# Patient Record
Sex: FEMALE | Race: WHITE | NOT HISPANIC OR LATINO | Employment: PART TIME | URBAN - METROPOLITAN AREA
[De-identification: names, ages, dates, MRNs, and addresses within clinical notes are randomized per-mention and may not be internally consistent; named-entity substitution may affect disease eponyms.]

---

## 2017-01-15 ENCOUNTER — APPOINTMENT (EMERGENCY)
Dept: RADIOLOGY | Facility: HOSPITAL | Age: 27
End: 2017-01-15
Payer: COMMERCIAL

## 2017-01-15 ENCOUNTER — HOSPITAL ENCOUNTER (EMERGENCY)
Facility: HOSPITAL | Age: 27
Discharge: HOME | End: 2017-01-15
Admitting: EMERGENCY MEDICINE
Payer: COMMERCIAL

## 2017-01-15 VITALS
DIASTOLIC BLOOD PRESSURE: 64 MMHG | RESPIRATION RATE: 16 BRPM | HEART RATE: 92 BPM | TEMPERATURE: 97.8 F | BODY MASS INDEX: 28.32 KG/M2 | WEIGHT: 170 LBS | SYSTOLIC BLOOD PRESSURE: 122 MMHG | OXYGEN SATURATION: 99 % | HEIGHT: 65 IN

## 2017-01-15 DIAGNOSIS — S39.012A LUMBAR STRAIN: ICD-10-CM
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
## 2017-01-15 DIAGNOSIS — S29.019A STRAIN OF THORACIC REGION: ICD-10-CM

## 2017-01-15 PROCEDURE — 99284 EMERGENCY DEPT VISIT MOD MDM: CPT

## 2017-01-15 PROCEDURE — 72072 X-RAY EXAM THORAC SPINE 3VWS: CPT

## 2017-01-15 PROCEDURE — 72100 X-RAY EXAM L-S SPINE 2/3 VWS: CPT

## 2017-01-15 RX ORDER — IBUPROFEN 200 MG
400 TABLET ORAL ONCE
Status: COMPLETED | OUTPATIENT
Start: 2017-01-15 | End: 2017-01-15

## 2017-01-15 RX ORDER — CYCLOBENZAPRINE HCL 10 MG
10 TABLET ORAL 3 TIMES DAILY PRN
Qty: 15 TABLET | Refills: 0 | Status: SHIPPED | OUTPATIENT
Start: 2017-01-15 | End: 2019-08-16

## 2017-01-15 RX ORDER — LORAZEPAM 1 MG/1
1 TABLET ORAL EVERY 6 HOURS PRN
COMMUNITY
End: 2018-02-14 | Stop reason: SDUPTHER

## 2017-01-15 RX ADMIN — IBUPROFEN 400 MG: 200 TABLET, FILM COATED ORAL at 12:22

## 2017-04-19 ENCOUNTER — GENERIC CONVERSION - ENCOUNTER (OUTPATIENT)
Dept: OTHER | Facility: OTHER | Age: 27
End: 2017-04-19

## 2017-04-28 ENCOUNTER — ALLSCRIPTS OFFICE VISIT (OUTPATIENT)
Dept: OTHER | Facility: OTHER | Age: 27
End: 2017-04-28

## 2017-04-28 ENCOUNTER — GENERIC CONVERSION - ENCOUNTER (OUTPATIENT)
Dept: OTHER | Facility: OTHER | Age: 27
End: 2017-04-28

## 2017-04-28 DIAGNOSIS — E01.0 IODINE-DEFICIENCY RELATED DIFFUSE GOITER: ICD-10-CM

## 2017-04-29 LAB
T3FREE SERPL-MCNC: 119 NG/DL (ref 71–180)
T4 FREE SERPL-MCNC: 0.87 NG/DL (ref 0.82–1.77)
THYROGLOBULIN AB (HISTORICAL): 66.7 IU/ML (ref 0–0.9)
TSH SERPL DL<=0.05 MIU/L-ACNC: 8.29 UIU/ML (ref 0.45–4.5)

## 2017-05-05 ENCOUNTER — GENERIC CONVERSION - ENCOUNTER (OUTPATIENT)
Dept: OTHER | Facility: OTHER | Age: 27
End: 2017-05-05

## 2017-05-05 LAB — THYROGLOBULIN BY RIA (HISTORICAL): 34 NG/ML

## 2017-05-06 ENCOUNTER — ALLSCRIPTS OFFICE VISIT (OUTPATIENT)
Dept: OTHER | Facility: OTHER | Age: 27
End: 2017-05-06

## 2017-06-01 ENCOUNTER — GENERIC CONVERSION - ENCOUNTER (OUTPATIENT)
Dept: OTHER | Facility: OTHER | Age: 27
End: 2017-06-01

## 2017-06-02 ENCOUNTER — ALLSCRIPTS OFFICE VISIT (OUTPATIENT)
Dept: OTHER | Facility: OTHER | Age: 27
End: 2017-06-02

## 2017-06-02 LAB
T3FREE SERPL-MCNC: 125 NG/DL (ref 71–180)
T4 FREE SERPL-MCNC: 0.99 NG/DL (ref 0.82–1.77)
TSH SERPL DL<=0.05 MIU/L-ACNC: 6.11 UIU/ML (ref 0.45–4.5)

## 2017-06-03 ENCOUNTER — HOSPITAL ENCOUNTER (OUTPATIENT)
Dept: ULTRASOUND IMAGING | Facility: HOSPITAL | Age: 27
Discharge: HOME | End: 2017-06-03
Payer: COMMERCIAL

## 2017-06-03 DIAGNOSIS — E01.0 IODINE-DEFICIENCY RELATED DIFFUSE GOITER: ICD-10-CM

## 2017-06-03 PROCEDURE — 76536 US EXAM OF HEAD AND NECK: CPT

## 2017-06-06 ENCOUNTER — GENERIC CONVERSION - ENCOUNTER (OUTPATIENT)
Dept: OTHER | Facility: OTHER | Age: 27
End: 2017-06-06

## 2017-06-12 ENCOUNTER — HOSPITAL ENCOUNTER (OUTPATIENT)
Dept: RADIOLOGY | Facility: HOSPITAL | Age: 27
Discharge: HOME | End: 2017-06-12
Payer: COMMERCIAL

## 2017-06-12 ENCOUNTER — ALLSCRIPTS OFFICE VISIT (OUTPATIENT)
Dept: OTHER | Facility: OTHER | Age: 27
End: 2017-06-12

## 2017-06-12 ENCOUNTER — TRANSCRIBE ORDERS (OUTPATIENT)
Dept: ADMINISTRATIVE | Facility: HOSPITAL | Age: 27
End: 2017-06-12

## 2017-06-12 DIAGNOSIS — R07.9 CHEST PAIN: ICD-10-CM

## 2017-06-12 PROCEDURE — 71020 HB CHEST X-RAY 2VW FRONTAL&LATL: CPT

## 2017-06-14 ENCOUNTER — GENERIC CONVERSION - ENCOUNTER (OUTPATIENT)
Dept: OTHER | Facility: OTHER | Age: 27
End: 2017-06-14

## 2017-06-15 ENCOUNTER — GENERIC CONVERSION - ENCOUNTER (OUTPATIENT)
Dept: OTHER | Facility: OTHER | Age: 27
End: 2017-06-15

## 2017-06-16 ENCOUNTER — GENERIC CONVERSION - ENCOUNTER (OUTPATIENT)
Dept: OTHER | Facility: OTHER | Age: 27
End: 2017-06-16

## 2017-06-23 ENCOUNTER — ALLSCRIPTS OFFICE VISIT (OUTPATIENT)
Dept: OTHER | Facility: OTHER | Age: 27
End: 2017-06-23

## 2018-01-11 NOTE — MISCELLANEOUS
Message   Recorded as Task   Date: 10/05/2016 05:57 PM, Created By: Laurier Najjar   Task Name: Med Renewal Request   Assigned To: Rupa Perez   Regarding Patient: Susy No, Status: Active   Comment:    Ana Duong - 05 Oct 2016 5:57 PM     TASK CREATED  Caller: Self; Renew Medication; [phone] (Home)  requesting alprazalam refill - asking for dose to be upped as she is taking 3 a day when having anxiety    please advise    lukasz Perez - 06 Oct 2016 7:32 AM     TASK REPLIED TO: Previously Assigned To [address] [place] [address], Ne not  She has not been seen for her anxiety in a long time  Taking alprazolam 3 times/day is not recommended  Obviously her anxiety is out of control so she will need to come in to discuss starting daily med or see psych  Citlalli Valenzuela - 06 Oct 2016 11:36 AM     TASK EDITED   Pt scheduled---She said the last med you put her on, makes her have a bad appetite ---she just feels bad and weird on it  She is scheduled for Oct 8th with you  Active Problems    1  Abdominal discomfort (789 00) (R10 9)   2  BRITTNY I (cervical intraepithelial neoplasia I) (622 11) (N87 0)   3  Current every day smoker (305 1) (F17 200)   4  Depression with anxiety (300 4) (F41 8)   5  Encounter for PPD test (V74 1) (Z11 1)   6  Enlarged thyroid (240 9) (E04 9)   7  Headache, temporal (784 0) (R51)   8  Insomnia (780 52) (G47 00)   9  Mild cervical dysplasia (622 11) (N87 0)   10  Need for pneumococcal vaccination (V03 82) (Z23)   11  Nicotine dependence (305 1) (F17 200)   12  S/P laparoscopic cholecystectomy (V45 89) (Z90 49)   13  Strep pharyngitis (034 0) (J02 0)   14  Tension type headache (339 10) (G44 209)   15  Thyroid antibody positive (795 79) (R76 0)   16  URTI (acute upper respiratory infection) (465 9) (J06 9)    Current Meds   1  ALPRAZolam 0 25 MG Oral Tablet; Take 1 tablet twice daily prn anxiety; Therapy: 03PPK6727 to (Evaluate:14Apr2016);  Last Rx:15Mar2016 Ordered   2  Fluticasone Propionate 50 MCG/ACT Nasal Suspension; USE 2 SPRAYS IN EACH   NOSTRIL ONCE DAILY; Therapy: 76PFC4904 to (Last Rx:13Jan2016)  Requested for: 33ARR0530 Ordered    Allergies    1   Biaxin TABS    Signatures   Electronically signed by : Patria Goldsmith; Oct  6 2016 11:48AM EST                       (Author)

## 2018-01-11 NOTE — RESULT NOTES
Verified Results  * XR CHEST PA & LATERAL [id number] 11:45AM Kassidy Sheppard Order Number: [id number]     Test Name Result Flag Reference   XR CHEST PA & LATERAL (Report)     CHEST - DUAL ENERGY     INDICATION: Chest pains for 4 days  COMPARISON: None     VIEWS: PA (including soft tissue/bone algorithms) and lateral projections     IMAGES: 4     FINDINGS:        Cardiomediastinal silhouette appears unremarkable  The lungs are clear  No pneumothorax or pleural effusion  No evidence of heart failure  Visualized osseous structures appear within normal limits for the patient's age  IMPRESSION:     No active pulmonary disease         Workstation performed: [id number]     Signed by:   Fareed Marti MD   6/14/17          Patient notified of results

## 2018-01-12 VITALS
HEIGHT: 64 IN | HEART RATE: 78 BPM | SYSTOLIC BLOOD PRESSURE: 138 MMHG | TEMPERATURE: 98.9 F | BODY MASS INDEX: 29.53 KG/M2 | WEIGHT: 173 LBS | DIASTOLIC BLOOD PRESSURE: 82 MMHG

## 2018-01-12 NOTE — RESULT NOTES
Verified Results  US THYROID [id number] 09:52AM Lula Dillard Order Number: [id number]    - Patient Instructions: To schedule this appointment, please contact Central Scheduling at [phone]  Test Name Result Flag Reference   US THYROID (Report)     THYROID ULTRASOUND     INDICATION: Abnormal thyroid function     COMPARISON: 1/13/2009     TECHNIQUE:  Ultrasound of the thyroid was performed with a high frequency linear transducer in transverse and sagittal planes including volumetric imaging sweeps as well as traditional still imaging technique  FINDINGS:   The thyroid is diffusely heterogeneous, similar to the prior study  There is mildly increased vascularity  Right gland: 1 8 x 1 6 x 5 4 cm  No dominant nodules  Left gland: 1 6 x 1 8 x 4 6 cm  No dominant nodules  Isthmus: The isthmus is 0 8 cm in AP dimension  A normal-appearing lymph node is identified inferior to the left lobe, likely reactive  IMPRESSION:      Heterogeneous gland with mildly increased vascularity consistent with autoimmune thyroiditis  Workstation performed: [id number]     Signed by:   Omar Marin MD   6/5/17        Pt aware & results faxed to [phone]

## 2018-01-12 NOTE — MISCELLANEOUS
Message  Return to work or school:        Rachel Cheung was unable to work 4/3 through 4/6/17 due to gastroenteritis          Signatures   Electronically signed by : Albin Huynh MD; Apr 19 2017  3:41PM EST                       (Author)

## 2018-01-13 VITALS
TEMPERATURE: 97.6 F | BODY MASS INDEX: 29.43 KG/M2 | HEIGHT: 64 IN | WEIGHT: 172.4 LBS | SYSTOLIC BLOOD PRESSURE: 122 MMHG | HEART RATE: 84 BPM | DIASTOLIC BLOOD PRESSURE: 82 MMHG

## 2018-01-13 VITALS
HEART RATE: 88 BPM | BODY MASS INDEX: 29.64 KG/M2 | SYSTOLIC BLOOD PRESSURE: 120 MMHG | DIASTOLIC BLOOD PRESSURE: 88 MMHG | TEMPERATURE: 98.1 F | HEIGHT: 64 IN | WEIGHT: 173.6 LBS

## 2018-01-14 VITALS
HEIGHT: 64 IN | WEIGHT: 174.2 LBS | BODY MASS INDEX: 29.74 KG/M2 | TEMPERATURE: 98.2 F | SYSTOLIC BLOOD PRESSURE: 112 MMHG | HEART RATE: 60 BPM | DIASTOLIC BLOOD PRESSURE: 76 MMHG

## 2018-01-14 VITALS
BODY MASS INDEX: 29.3 KG/M2 | HEIGHT: 64 IN | DIASTOLIC BLOOD PRESSURE: 72 MMHG | HEART RATE: 96 BPM | WEIGHT: 171.6 LBS | SYSTOLIC BLOOD PRESSURE: 118 MMHG | TEMPERATURE: 98.6 F

## 2018-01-15 NOTE — MISCELLANEOUS
Message   Recorded as Task   Date: 06/15/2017 02:25 PM, Created By: Anil Garcia   Task Name: Call Patient with results   Assigned To: Anil Garcia   Regarding Patient: Mj Barbour, Status: Active   CommentTr Marks - 15 Zechariah 2017 2:25 PM     Patient Phone: [phone]      Please call pt and let her know that chest xray was normal  Has her pain improved? Tisha Marc - 15 Zechariah 2017 2:29 PM     TASK REASSIGNED: Previously Assigned To Donte Beatty - 15 Zechariah 2017 2:35 PM     TASK EDITED  Left message   RubiojacobyTerra - 15 Zechariah 2017 2:48 PM     TASK REPLIED TO: Previously Assigned To [address] [facility]  Patient said pain has not improved - also said medication is not helping  Bev Lang - 16 Jun 2017 7:45 AM     TASK REPLIED TO: Previously Assigned To Anil Garcia  Can she be re-evalluated today? Citlalli Valenzuela - 16 Jun 2017 8:00 AM     TASK REPLIED TO: Previously Assigned To [address] [facility]  Pt does not want to come in  She is still feeling the same---She is aware that the Chest xray was normal  She will call next week if she still feels the same  Active Problems    1  Chest pain (786 50) (R07 9)   2  BRITTNY I (cervical intraepithelial neoplasia I) (622 11) (N87 0)   3  Current every day smoker (305 1) (F17 200)   4  Depression with anxiety (300 4) (F41 8)   5  Enlarged thyroid (240 9) (E01 0)   6  Hashimoto's thyroiditis (245 2) (E06 3)   7  Headache (784 0) (R51)   8  Insomnia (780 52) (G47 00)   9  Mild cervical dysplasia (622 11) (N87 0)   10  Neck pain on left side (723 1) (M54 2)   11  Nicotine dependence (305 1) (F17 200)   12  Rash of hands (782 1) (R21)    Current Meds   1  Levothyroxine Sodium 25 MCG Oral Tablet; TAKE 2 TABLETS DAILY IN THE MORNING; Therapy: 15CZA1811 to (Evaluate:16Jun2017)  Requested for: 47YOB[birth year]; Last   Rx:02Jun2017 Ordered   2  LORazepam 0 5 MG Oral Tablet; Take 1 tablet twice daily prn anxiety;    Therapy: 15TTR7507 to (Evaluate:07Nov2016); Last Rx:08Oct2016 Ordered   3  Naproxen Sodium 550 MG Oral Tablet; TAKE 1 TABLET EVERY 12 HOURS AS   NEEDED; Therapy: 69YKO0603 to (Evaluate:26Jun2017)  Requested for: 12Jun2017; Last   Rx:12Jun2017 Ordered   4  Nicotine 14 MG/24HR Transdermal Patch 24 Hour; APPLY 1 PATCH DAILY AS   DIRECTED; Therapy: 89BDM9029 to (Evaluate:30Jun2017)  Requested for: 86VIA8048; Last   Rx:02Jun2017 Ordered   5  ProAir  (90 Base) MCG/ACT Inhalation Aerosol Solution; INHALE 2 PUFFS   EVERY 4 HOURS AS NEEDED FOR COUGH AND WHEEZE;   Therapy: 82OLP7457 to (Last YE:05FMX6815)  Requested for: 75CKD0332 Ordered    Allergies    1   Biaxin TABS    Signatures   Electronically signed by : José Miguel Loo; Jun 16 2017  9:27AM EST                       (Author)

## 2018-01-16 NOTE — MISCELLANEOUS
Message  Message Free Text Note Form: Patient called c/o fever up to 100 7, nausea, dry heaves and headache starting overnight  Symptoms getting worse, not relieved w/ondansetron  Not eating  Drinking a little  Had gallbladder out last month  Did not call surgeon  Advised she be seen in ER as she likely needs IVFs and antiemetics  States she will go now  Signatures   Electronically signed by : ORESTES Rhodes;  Aug  6 2016  2:26PM EST                       (Author)

## 2018-01-16 NOTE — MISCELLANEOUS
Assessment    1  S/P laparoscopic cholecystectomy (V45 89) (Z90 49)   2  History of acute cholecystitis (V12 79) (Z87 19)    Discussion/Summary  Discussion Summary:   Reviewed hospital records  Doing well  F/U with surgeon in 2 days as scheduled  Medication SE Review and Pt Understands Tx: The treatment plan was reviewed with the patient/guardian  The patient/guardian understands and agrees with the treatment plan      Chief Complaint  Chief Complaint Free Text Note Form: NATY      History of Present Illness  TCM Communication [facility] records were reviewed  She was hospitalized at [facility]  The date of admission: [id number], date of discharge: 07202016  Diagnosis: cholecystitis  She was discharged to home  She scheduled a follow up appointment  Symptoms: middle abdominal pain  The patient is currently asymptomatic  Counseling was provided to the patient  Communication performed and completed by Jackie Mcgregor   HPI: Had gallbladder removed 7/20  Had episode of cholecystitis  Had 3 gallstones removed  Feels good  eating and drinking w/o problems  Has incisional pain  Finished pain meds  Sometimes pain is bad but mostly controlled  Sometimes has diarrhea  Has formed stools  No blood  No N/V  Denies abdominal pain  Denies redness, swelling, drainage at incisions  Has appointment with surgery in 2 days  Review of Systems  Complete-Female:   Constitutional: no fever and no chills  Gastrointestinal: as noted in HPI  Integumentary: as noted in HPI  Active Problems    1  BRITTNY I (cervical intraepithelial neoplasia I) (622 11) (N87 0)   2  Depression with anxiety (300 4) (F41 8)   3  Encounter for PPD test (V74 1) (Z11 1)   4  Enlarged thyroid (240 9) (E04 9)   5  Headache, temporal (784 0) (R51)   6  Insomnia (780 52) (G47 00)   7  Mild cervical dysplasia (622 11) (N87 0)   8  Need for pneumococcal vaccination (V03 82) (Z23)   9   Nicotine dependence (305 1) (F17 200)   10  Strep pharyngitis (034 0) (J02 0)   11  Tension type headache (339 10) (G44 209)   12  Thyroid antibody positive (795 79) (R76 0)   13  URTI (acute upper respiratory infection) (465 9) (J06 9)    Past Medical History    1  History of Dog bite (879 8,E906 0) (W54  0XXA)   2  History of  3 (V22 2) (Z33 1)   3  History of acute bronchitis (V12 69) (Z87 09)   4  History of acute cholecystitis (V12 79) (Z87 19)   5  History of bronchitis (V12 69) (Z87 09)   6  History of conjunctivitis (V12 49) (Z86 69)   7  History of insomnia (V13 89) (Z87 898)   8  History of pharyngitis (V12 69) (Z87 09)   9  History of pregnancy (V13 29)   10  History of serous otitis media (V12 49) (Z86 69)   11  History of Lower abdominal pain (789 09) (R10 30)   12  History of Mild cervical dysplasia (622 11) (N87 0)   13  Neck pain (723 1) (M54 2)   14  History of Neck pain (723 1) (M54 2)   15  History of URTI (acute upper respiratory infection) (465 9) (J06 9)    Family History  Mother    1  No pertinent family history  Father    2  No pertinent family history    Social History     · Denied: History of Alcohol Use (History)   · Always uses seat belt   · Caffeine use (V49 89) (F15 90)   · Denied: History of Drug Use   · Four children   · No Roman [employer] beliefs   · Under Stress  Social History Reviewed: The social history was reviewed and updated today  The social history was reviewed and is unchanged  Current Smoker (305 1)             Current Meds   1  ALPRAZolam 0 25 MG Oral Tablet; Take 1 tablet twice daily prn anxiety; Therapy: 97ODK2514 to (Evaluate:2016); Last Rx:2016 Ordered   2  Fluticasone Propionate 50 MCG/ACT Nasal Suspension; USE 2 SPRAYS IN EACH   NOSTRIL ONCE DAILY; Therapy: 34JAY1731 to (Last Rx:2016)  Requested for: 68TKN4451 Ordered   3  Junel FE  1 5-30 MG-MCG Oral Tablet; TAKE 1 TABLET DAILY; Therapy: 25IOH2728 to (Evaluate:30Dzi1822)  Requested for: 62CCY7988;  Last WC:51ZXM3098 Ordered  Medication List Reviewed: The medication list was reviewed and updated today  Allergies    1  Biaxin TABS    Vitals  Signs   Recorded: 77FNA9853 11:95ZT   Systolic: 469  Diastolic: 68  Heart Rate: 76  Temperature: 100 F  Height: 5 ft 4 in  Weight: 165 lb   BMI Calculated: 28 32  BSA Calculated: 1 81    Physical Exam    Constitutional   General appearance: No acute distress, well appearing and well nourished  Pulmonary   Respiratory effort: No increased work of breathing or signs of respiratory distress  Auscultation of lungs: Clear to auscultation  Cardiovascular   Auscultation of heart: Normal rate and rhythm, normal S1 and S2, without murmurs  Abdomen   Abdomen: Non-tender, no masses  2 LUQ incisions D/I  Umbilical incision D/I  Liver and spleen: No hepatomegaly or splenomegaly  Psychiatric   Orientation to person, place, and time: Normal     Mood and affect: Normal          Results/Data  PHQ-9 Adult Depression Screening 23Vjr5020 06:16PM User, [employer]     Test Name Result Flag Reference   PHQ-9 Adult Depression Score 5     Over the last two weeks, how often have you been bothered by any of the following problems? Little interest or pleasure in doing things: Not at all - 0  Feeling down, depressed, or hopeless: More than half the days - 2  Trouble falling or staying asleep, or sleeping too much: Several days - 1  Feeling tired or having little energy: Not at all - 0  Poor appetite or over eating: Not at all - 0  Feeling bad about yourself - or that you are a failure or have let yourself or your family down: Several days - 1  Trouble concentrating on things, such as reading the newspaper or watching television: Not at all - 0  Moving or speaking so slowly that other people could have noticed   Or the opposite -  being so fidgety or restless that you have been moving around a lot more than usual: Not at all - 0  Thoughts that you would be better off dead, or of hurting yourself in some way: Several days - 1   PHQ-9 Adult Depression Screening Negative     PHQ-9 Difficulty Level Not difficult at all     PHQ-9 Severity Mild Depression         Future Appointments    Date/Time Provider Specialty Site   08/04/2016 01:30 PM Nichole Mcconnell MD General Surgery [facility]     Signatures   Electronically signed by : Brandon Ku ;  Aug  2 2016  6:38PM EST                       (Author)    Electronically signed by : Gianni Rankin DO; Aug  2 2016  8:17PM EST                       (Author)

## 2018-01-16 NOTE — MISCELLANEOUS
Message  Return to work or school:   Kaylee Schmidt is under my professional care  She was seen in my office on 1/13/16   She is able to return to work on  1/15/16       Brandon Yoo        Signatures   Electronically signed by : Brandon Yoo; Jan 13 2016  1:35PM EST                       (Author)

## 2018-01-17 NOTE — RESULT NOTES
Message   Recorded as Task   Date: 02/11/2016 08:37 AM, Created By: Citlalli Dukes   Task Name: Call Back   Assigned To: [address] [place] [address]   Regarding Patient: Alvia Holter, Status: Active   Comment:    Citlalli Dukes - 11 Feb 2016 8:37 AM     TASK CREATED  Please call pt and let her know that her thyrpoid is underactive and she is positive for thyroid antibodies which indicates a thyroiditis  I ordered an US of her thyroid but she will need to see endocrinology asap  She has [facility] so I am unsure what endocrinologists will take her insurance  Give her the number for SL and Dr Miller Push  If she has any trouble scheduling especially with [facility] she should let us know and we will try to facilitate an appointment  I printed order for US  Jael Arthur - 11 Feb 2016 9:07 AM     TASK REPLIED TO: Previously Assigned To [address] [place] [address]  Pt is aware & order was placed in file up front  I called both Providers & neither one participates w/Cy Cosme    Citlalli Dukes - 11 Feb 2016 9:30 AM     TASK REPLIED TO: Previously Assigned To Citlalli Dukes  That is fine  We will start with US and go from there  Signatures   Electronically signed by :  Kalyan Sharp, ; Feb 11 2016  9:33AM EST                       (Author)

## 2018-01-17 NOTE — RESULT NOTES
Verified Results  (1) T3 TOTAL 28Apr2017 01:07PM [employer]     Test Name Result Flag Reference   Triiodothyronine (T3) 119 ng/dL       (1) TSH 28Apr2017 01:07PM [employer]     Test Name Result Flag Reference   TSH 8 290 uIU/mL H 0 450-4 500     (LC) Thyroxine (T4) Free, Direct, S 28Apr2017 01:07PM [employer]     Test Name Result Flag Reference   T4,Free(Direct) 0 87 ng/dL  0 82-1 77     (LC) TgAb+Thyroglobulin,YUE or JOVITA 28Apr2017 01:07PM [employer]     Test Name Result Flag Reference   Thyroglobulin Antibody Thyroglobulin Ab 66 7 IU/mL H 0 0-0 9   Thyroglobulin Antibody measured by GROU.PS Methodology     () Thyroglobulin by JOVITA 25UAD0929 01:07PM [employer]     Test Name Result Flag Reference   Thyroglobulin by JOVITA Thyroglobulin 34 ng/mL     Reference Range:  Pubertal Children  and Adults: <40  According to the [facility] of Clinical Biochemistry,  the reference interval for Thyroglobulin (TG) should be  related to euthyroid patients and not for patients who  underwent thyroidectomy  TG reference intervals for these  patients depend on the residual mass of the thyroid tissue  left after surgery  Establishing a post-operative baseline  is recommended  The assay quantitation limit is 2 0 ng/mL         Plan  Hashimoto's thyroiditis    · Start: Levothyroxine Sodium 25 MCG Oral Tablet; TAKE 1 TABLET DAILY IN THE  MORNING

## 2018-01-18 NOTE — RESULT NOTES
Message   Please call pt and let her know that she did test positive for influenza B  She should continue with Tamiflu until complete  Verified Results  (1) RAPID INFLUENZA SCREEN with reflex to PCR 08THT6537 04:00PM Viktor Ayala     Test Name Result Flag Reference   INFLUENZA A/MATRIX None Detected  None Detected   INFLUENZA B Detected A None Detected   RESP SYNCYTIAL VIRUS None Detected  None Detected       Discussion/Summary   Patient aware     Noel Gordon MA 7:55AM 03/02/20161       1 Amended By: Jacobo Guevara; Mar 02 2016 7:53 AM EST

## 2018-02-14 ENCOUNTER — OFFICE VISIT (OUTPATIENT)
Dept: FAMILY MEDICINE CLINIC | Facility: HOSPITAL | Age: 28
End: 2018-02-14
Payer: COMMERCIAL

## 2018-02-14 VITALS
HEART RATE: 84 BPM | WEIGHT: 184.4 LBS | TEMPERATURE: 99 F | BODY MASS INDEX: 30.72 KG/M2 | DIASTOLIC BLOOD PRESSURE: 72 MMHG | SYSTOLIC BLOOD PRESSURE: 124 MMHG | HEIGHT: 65 IN

## 2018-02-14 DIAGNOSIS — F41.9 ANXIETY: Primary | ICD-10-CM

## 2018-02-14 DIAGNOSIS — J20.9 ACUTE BRONCHITIS, UNSPECIFIED ORGANISM: Primary | ICD-10-CM

## 2018-02-14 PROBLEM — E03.4 HYPOTHYROIDISM DUE TO ACQUIRED ATROPHY OF THYROID: Status: ACTIVE | Noted: 2017-06-08

## 2018-02-14 PROBLEM — E06.3 HASHIMOTO'S THYROIDITIS: Status: ACTIVE | Noted: 2017-05-05

## 2018-02-14 PROCEDURE — 99214 OFFICE O/P EST MOD 30 MIN: CPT | Performed by: NURSE PRACTITIONER

## 2018-02-14 RX ORDER — BETAMETHASONE DIPROPIONATE 0.5 MG/G
OINTMENT TOPICAL 2 TIMES DAILY
COMMUNITY
Start: 2017-06-23 | End: 2019-08-16

## 2018-02-14 RX ORDER — NICOTINE 21 MG/24HR
1 PATCH, TRANSDERMAL 24 HOURS TRANSDERMAL DAILY
COMMUNITY
Start: 2017-06-02 | End: 2019-08-16

## 2018-02-14 RX ORDER — CLOBETASOL PROPIONATE 0.5 MG/G
OINTMENT TOPICAL
COMMUNITY
Start: 2018-01-27 | End: 2019-08-16

## 2018-02-14 RX ORDER — LEVOTHYROXINE SODIUM 75 UG/1
75 CAPSULE ORAL
COMMUNITY
Start: 2017-07-21 | End: 2021-09-27 | Stop reason: SDUPTHER

## 2018-02-14 RX ORDER — HYDROXYZINE HYDROCHLORIDE 25 MG/1
TABLET, FILM COATED ORAL
COMMUNITY
Start: 2017-11-28 | End: 2019-09-27

## 2018-02-14 RX ORDER — DOXYCYCLINE 100 MG/1
100 CAPSULE ORAL 2 TIMES DAILY
Qty: 20 CAPSULE | Refills: 0 | Status: SHIPPED | OUTPATIENT
Start: 2018-02-14 | End: 2018-02-19 | Stop reason: SINTOL

## 2018-02-14 NOTE — PROGRESS NOTES
Assessment/Plan:  Acute bronchitis in smoker  Given longevity with worsening will issue antibiotic  Recommend starting cough suppressant  Call with any worsening cough, sob or wheezing  Diagnoses and all orders for this visit:    Acute bronchitis, unspecified organism  -     doxycycline monohydrate (MONODOX) 100 mg capsule; Take 1 capsule (100 mg total) by mouth 2 (two) times a day for 10 days    Other orders  -     nicotine (NICODERM CQ) 14 mg/24hr TD 24 hr patch; Place 1 patch on the skin daily  -     Levothyroxine Sodium 75 MCG CAPS; Take 75 mcg by mouth  -     clobetasol (TEMOVATE) 0 05 % ointment;   -     betamethasone, augmented, (DIPROLENE) 0 05 % ointment; Apply topically 2 (two) times a day  -     hydrOXYzine HCL (ATARAX) 25 mg tablet;   -     methotrexate 2 5 mg tablet;           Subjective:      Patient ID: Emma Rajput is a 32 y o  female  Has cough  Bringing up mucus  Pain in temples worse when coughing  Cough for 2 weeks  Pain in head for about 1 week  Had some nasal congestion which is better  Started out as losing voice  Denies sore throat, ear pain  Denies N/V/D  Smoker  Denies asthma history  Denies sob and wheezing  Denies fever,chills  Feels cough is getting worse  Has been taking ibuprofen and Tylenol and NyQuil which was not helping  The following portions of the patient's history were reviewed and updated as appropriate: allergies, current medications, past medical history, past social history and problem list     Review of Systems   Constitutional: Positive for fatigue  Negative for chills and fever  HENT: Negative for congestion, ear pain, sinus pain, sinus pressure and sore throat  Respiratory: Positive for cough  Negative for shortness of breath and wheezing  Gastrointestinal: Negative for diarrhea, nausea and vomiting  Musculoskeletal: Negative for arthralgias and myalgias  Neurological: Positive for headaches           Objective:    Vitals:    02/14/18 1442   BP: 124/72   Pulse: 84   Temp: 99 °F (37 2 °C)        Physical Exam   Constitutional: She is oriented to person, place, and time  She appears well-developed and well-nourished  HENT:   Right Ear: Tympanic membrane, external ear and ear canal normal    Left Ear: Tympanic membrane, external ear and ear canal normal    Mouth/Throat: Uvula is midline and oropharynx is clear and moist    Lymphadenopathy:     She has no cervical adenopathy  Neurological: She is alert and oriented to person, place, and time  Skin: Skin is warm and dry  Psychiatric: She has a normal mood and affect

## 2018-02-16 ENCOUNTER — TELEPHONE (OUTPATIENT)
Dept: FAMILY MEDICINE CLINIC | Facility: HOSPITAL | Age: 28
End: 2018-02-16

## 2018-02-16 RX ORDER — LORAZEPAM 1 MG/1
TABLET ORAL
Qty: 30 TABLET | Refills: 0 | Status: SHIPPED | OUTPATIENT
Start: 2018-02-16 | End: 2018-07-24 | Stop reason: SDUPTHER

## 2018-02-19 ENCOUNTER — OFFICE VISIT (OUTPATIENT)
Dept: FAMILY MEDICINE CLINIC | Facility: HOSPITAL | Age: 28
End: 2018-02-19
Payer: COMMERCIAL

## 2018-02-19 VITALS
SYSTOLIC BLOOD PRESSURE: 126 MMHG | HEIGHT: 65 IN | WEIGHT: 186.2 LBS | HEART RATE: 80 BPM | DIASTOLIC BLOOD PRESSURE: 88 MMHG | TEMPERATURE: 98.6 F | BODY MASS INDEX: 31.02 KG/M2

## 2018-02-19 DIAGNOSIS — L40.9 PSORIASIS: ICD-10-CM

## 2018-02-19 DIAGNOSIS — J06.9 ACUTE UPPER RESPIRATORY INFECTION: ICD-10-CM

## 2018-02-19 DIAGNOSIS — F41.8 DEPRESSION WITH ANXIETY: Primary | ICD-10-CM

## 2018-02-19 PROCEDURE — 3725F SCREEN DEPRESSION PERFORMED: CPT | Performed by: NURSE PRACTITIONER

## 2018-02-19 PROCEDURE — 99214 OFFICE O/P EST MOD 30 MIN: CPT | Performed by: NURSE PRACTITIONER

## 2018-02-20 NOTE — ASSESSMENT & PLAN NOTE
A:  + PHQ-9 of 12 and with episodic anxiety attacks  P: continue lorazepam as needed only for anxiety need, discussed need to consider daily medication if benzo use increases or if depressive symptoms worsen

## 2018-02-20 NOTE — PROGRESS NOTES
Assessment/Plan:    Depression with anxiety  A: + PHQ-9 of 12 and with episodic anxiety attacks  P: continue lorazepam as needed only for anxiety need, discussed need to consider daily medication if benzo use increases or if depressive symptoms worsen       Psoriasis  A: on immunosuppression per dermatology   P: f/u with dermatology as planned to start injection       Diagnoses and all orders for this visit:    Depression with anxiety    Psoriasis    Acute upper respiratory infection  Comments:  improving, continue symptomatic care and call with any worsening/persistent symptoms           Subjective:      Patient ID: Marlys Berman is a 32 y o  female here for a med check  Stopped taking doxycycline last week due to nausea  Getting better but still coughing up mucous  She will be starting injections for psoriasis and will stop methotrexate  Had been out of lorazepam for awhile  Usually takes about 3-4 times/week due to anxiety from stress at home  Feels she has to have on hand for times when she has contact with her 6year old son  Has not seen him in 7 years but recently started communicating  Has some down days but most days have been pretty good  Denies SI/HI  The following portions of the patient's history were reviewed and updated as appropriate: allergies, current medications, past medical history, past social history and problem list     Review of Systems   Constitutional: Negative for fever  Respiratory: Positive for cough  Negative for shortness of breath and wheezing  Psychiatric/Behavioral: Positive for dysphoric mood  Negative for sleep disturbance and suicidal ideas  The patient is nervous/anxious  Objective:      /88 (Patient Position: Sitting, Cuff Size: Standard)   Pulse 80   Temp 98 6 °F (37 °C) (Tympanic)   Ht 5' 5" (1 651 m)   Wt 84 5 kg (186 lb 3 2 oz)   BMI 30 99 kg/m²          Physical Exam   Constitutional: She is oriented to person, place, and time  She appears well-developed and well-nourished  No distress  HENT:   Head: Normocephalic and atraumatic  Eyes: Conjunctivae are normal    Neck: No thyromegaly present  Cardiovascular: Normal rate and regular rhythm  Pulmonary/Chest: Effort normal and breath sounds normal  No respiratory distress  Lymphadenopathy:     She has no cervical adenopathy  Neurological: She is alert and oriented to person, place, and time  Psychiatric: She has a normal mood and affect   Her behavior is normal  Thought content normal

## 2018-07-24 ENCOUNTER — OFFICE VISIT (OUTPATIENT)
Dept: FAMILY MEDICINE CLINIC | Facility: HOSPITAL | Age: 28
End: 2018-07-24
Payer: COMMERCIAL

## 2018-07-24 VITALS
SYSTOLIC BLOOD PRESSURE: 110 MMHG | TEMPERATURE: 98.2 F | WEIGHT: 188.4 LBS | HEART RATE: 80 BPM | HEIGHT: 64 IN | BODY MASS INDEX: 32.17 KG/M2 | DIASTOLIC BLOOD PRESSURE: 80 MMHG

## 2018-07-24 DIAGNOSIS — F41.9 ANXIETY: ICD-10-CM

## 2018-07-24 DIAGNOSIS — L40.9 PSORIASIS: ICD-10-CM

## 2018-07-24 DIAGNOSIS — F41.8 DEPRESSION WITH ANXIETY: ICD-10-CM

## 2018-07-24 DIAGNOSIS — M25.562 CHRONIC PAIN OF LEFT KNEE: Primary | ICD-10-CM

## 2018-07-24 DIAGNOSIS — G89.29 CHRONIC PAIN OF LEFT KNEE: Primary | ICD-10-CM

## 2018-07-24 PROCEDURE — 99214 OFFICE O/P EST MOD 30 MIN: CPT | Performed by: NURSE PRACTITIONER

## 2018-07-24 RX ORDER — LORAZEPAM 1 MG/1
TABLET ORAL
Qty: 30 TABLET | Refills: 0 | Status: SHIPPED | OUTPATIENT
Start: 2018-07-24 | End: 2019-02-23

## 2018-07-24 RX ORDER — MELOXICAM 15 MG/1
15 TABLET ORAL DAILY
Qty: 30 TABLET | Refills: 0 | Status: SHIPPED | OUTPATIENT
Start: 2018-07-24 | End: 2019-08-16

## 2018-07-24 NOTE — PROGRESS NOTES
Assessment/Plan:    Psoriasis  On immunosuppressants per dermatology  Depression with anxiety  PDMP reviewed w/no recent refills of lorazepam documented  Refill issued to use as needed only for increased anxiety  Return in 2-3 months for med check  Diagnoses and all orders for this visit:    Chronic pain of left knee  Comments:  given chronicity, will x-ray at this time and issue NSAID to use as needed  May need ortho consult and/or PT pending result of x-ray  Orders:  -     XR knee 3 vw left non injury; Future  -     meloxicam (MOBIC) 15 mg tablet; Take 1 tablet (15 mg total) by mouth daily    Anxiety  -     LORazepam (ATIVAN) 1 mg tablet; Take 1/2-1 tablet twice daily as needed for anxiety    Psoriasis    Depression with anxiety    Other orders  -     adalimumab (HUMIRA) 20 mg/0 4 mL PSKT injection; Inject 20 mg under the skin once      Aware she is due to update pap smear w/gyn (has mirena IUD)  Subjective:      Patient ID: Alisa Lee is a 25 y o  female here for an acute visit  Has been having pain in left knee for a few months  Has trouble bending at work and then rising back up  Feels it pop and almost give out when she stands  Not swollen, red or warm  No injury known  Had been taking tylenol without relief so started taking advil PM with better help  Takes aleve during day  Requests refill of lorazepam w/having higher stress after death of grandmother  States she has run out of and hasn't taken recently  The following portions of the patient's history were reviewed and updated as appropriate: allergies, current medications, past medical history, past social history and problem list     Review of Systems   Musculoskeletal: Positive for arthralgias  Psychiatric/Behavioral: The patient is nervous/anxious            Objective:      /80 (Patient Position: Sitting, Cuff Size: Standard)   Pulse 80   Temp 98 2 °F (36 8 °C) (Tympanic)   Ht 5' 4 2" (1 631 m)   Wt 85 5 kg (188 lb 6 4 oz)   BMI 32 14 kg/m²        Physical Exam   Constitutional: She appears well-developed and well-nourished  No distress  HENT:   Head: Normocephalic and atraumatic  Eyes: Conjunctivae are normal    Musculoskeletal:        Left knee: She exhibits no swelling, no effusion, no erythema, no LCL laxity and no MCL laxity  Tenderness found  Medial joint line and lateral joint line tenderness noted  Skin: Skin is warm and dry  Psychiatric: She has a normal mood and affect  Her behavior is normal  Judgment and thought content normal    Patiently interactive w/3 children   Vitals reviewed

## 2018-07-24 NOTE — ASSESSMENT & PLAN NOTE
PDMP reviewed w/no recent refills of lorazepam documented  Refill issued to use as needed only for increased anxiety  Return in 2-3 months for med check

## 2018-07-28 ENCOUNTER — HOSPITAL ENCOUNTER (OUTPATIENT)
Dept: RADIOLOGY | Facility: HOSPITAL | Age: 28
Discharge: HOME | End: 2018-07-28
Payer: COMMERCIAL

## 2018-07-28 DIAGNOSIS — G89.29 CHRONIC PAIN OF LEFT KNEE: ICD-10-CM

## 2018-07-28 DIAGNOSIS — M25.562 CHRONIC PAIN OF LEFT KNEE: ICD-10-CM

## 2018-07-28 PROCEDURE — 73562 X-RAY EXAM OF KNEE 3: CPT

## 2018-07-30 DIAGNOSIS — M25.562 ACUTE PAIN OF LEFT KNEE: Primary | ICD-10-CM

## 2018-08-01 ENCOUNTER — OFFICE VISIT (OUTPATIENT)
Dept: OBGYN CLINIC | Facility: CLINIC | Age: 28
End: 2018-08-01
Payer: COMMERCIAL

## 2018-08-01 VITALS
BODY MASS INDEX: 31.09 KG/M2 | SYSTOLIC BLOOD PRESSURE: 123 MMHG | DIASTOLIC BLOOD PRESSURE: 82 MMHG | WEIGHT: 186.6 LBS | HEART RATE: 86 BPM | HEIGHT: 65 IN

## 2018-08-01 DIAGNOSIS — M17.0 PRIMARY OSTEOARTHRITIS OF BOTH KNEES: Primary | ICD-10-CM

## 2018-08-01 DIAGNOSIS — M22.2X1 PATELLOFEMORAL PAIN SYNDROME OF BOTH KNEES: ICD-10-CM

## 2018-08-01 DIAGNOSIS — M22.2X2 PATELLOFEMORAL PAIN SYNDROME OF BOTH KNEES: ICD-10-CM

## 2018-08-01 PROCEDURE — 99203 OFFICE O/P NEW LOW 30 MIN: CPT | Performed by: ORTHOPAEDIC SURGERY

## 2018-08-01 NOTE — PROGRESS NOTES
Assessment:     1  Primary osteoarthritis of both knees    2  Patellofemoral pain syndrome of both knees          Plan:     Problem List Items Addressed This Visit        Musculoskeletal and Integument    Patellofemoral pain syndrome of both knees     18-year-old female with bilateral patellofemoral pain, secondary to some early osteoarthritis  I reviewed the radiographs with her, discussed the diagnosis  We discussed the importance of anti-inflammatories, icing, low-impact exercise, and weight management  We will get her going with physical therapy  I will see her back as needed  Relevant Orders    Ambulatory referral to Physical Therapy    Primary osteoarthritis of both knees - Primary    Relevant Orders    Ambulatory referral to Physical Therapy           Patient ID: Neo Gordon is a 25 y o  female  Chief Complaint:  Knee pain, left greater than right    HPI:  18-year-old female with pain in her bilateral knees, left much greater than right  This been going on and progressing over the last several months, since May or June  She points to around the knee cap as to where the pain is the worst   She describes is shooting type pain in the knee cap  It bothers her if she has been sitting with her knees bent for long period of time  She has some discomfort going up and down stairs or kneeling or getting up from squatting  There is no specific injury  She denies locking, catching  She has had occasional episodes of feeling like the knee gives out on her  She has been taking meloxicam in the past as well as Aleve at times  She does not notice any swelling  She tried heat once that this did not help  She has not done any physical therapy or had any injections    Patient's medical intake was reviewed        Allergy:  Allergies   Allergen Reactions    Clarithromycin Rash     Reaction Date: [id number];  Biaxin       Medications:  all current active meds have been reviewed    Past Medical History:  Past Medical History:   Diagnosis Date    Anxiety     Disease of thyroid gland     Insomnia     resolved 04/06/2015    Mild cervical dysplasia     resolved 04/06/2015    Substance abuse     Tension headache     resolved 10/08/2016       Past Surgical History:  Past Surgical History:   Procedure Laterality Date    CHOLECYSTECTOMY LAPAROSCOPIC N/A 7/20/2016    Procedure: CHOLECYSTECTOMY LAPAROSCOPIC;  Surgeon: Zack Crisostomo MD;  Location: [facility] OR;  Service:    t THYROID ANTIBODIES PANEL (HISTORICAL)      positive resolved 10/08/2016       Family History:  Family History   Problem Relation Age of Onset    Depression Mother        Social History:  History   Alcohol Use No     Comment: (history)     History   Drug Use No     Comment: crack cocaine use clean x 9 years per allscripts     History   Smoking Status    Current Every Day Smoker   Smokeless Tobacco    Never Used     Comment: Has an appointment to see PMD           ROS:  Review of Systems   Constitutional: Negative  HENT: Negative  Eyes: Negative  Respiratory: Negative  Gastrointestinal: Negative  Endocrine: Negative  Genitourinary: Negative  Musculoskeletal: Positive for arthralgias  Skin: Negative  Allergic/Immunologic: Negative  Neurological: Negative  Hematological: Negative  Psychiatric/Behavioral: Negative  Objective:  BP Readings from Last 1 Encounters:   08/01/18 123/82      Wt Readings from Last 1 Encounters:   08/01/18 84 6 kg (186 lb 9 6 oz)        BMI:   Estimated body mass index is 31 05 kg/m² as calculated from the following:    Height as of this encounter: 5' 5" (1 651 m)  Weight as of this encounter: 84 6 kg (186 lb 9 6 oz)  EXAM:   Physical Exam   Constitutional: She is oriented to person, place, and time  She appears well-developed and well-nourished  No distress  HENT:   Head: Normocephalic and atraumatic  Eyes: Right eye exhibits no discharge   Left eye exhibits no discharge  Neck: Normal range of motion  Neck supple  No tracheal deviation present  Cardiovascular: Normal rate and regular rhythm  Pulmonary/Chest: Effort normal and breath sounds normal  No respiratory distress  She exhibits no tenderness  Abdominal: Soft  She exhibits no distension  There is no tenderness  Neurological: She is alert and oriented to person, place, and time  Skin: Skin is warm and dry  She is not diaphoretic  No erythema  Psychiatric: She has a normal mood and affect  Her behavior is normal    Vitals reviewed  Right Knee Exam     Comments:  Full active range of motion with very mild crepitus, slight lateral tracking of the patella with a negative J sign  Stable to varus and valgus stress with a negative Lachman and stable posterior drawer  No knee effusion  Mild tenderness along the medial joint line and medial patellar tendon  Positive patellar grind  No lateral tenderness  5/5 strength with knee flexion and extension  Sensation light touch intact in the superficial and deep peroneal nerve, the sural nerve, the saphenous nerve  Palpable dorsal pedis pulse  Flexion and extension of the toes and the ankles intact  Left Knee Exam     Comments:  Full active range of motion with mild crepitus, slight lateral tracking of the patella with a negative J sign  Stable to varus and valgus stress with a negative Lachman and stable posterior drawer  No knee effusion  Mild tenderness along the medial joint line and medial patellar tendon  Positive patellar grind  No lateral tenderness  5/5 strength with knee flexion and extension  Sensation light touch intact in the superficial and deep peroneal nerve, the sural nerve, the saphenous nerve  Palpable dorsal pedis pulse  Flexion and extension of the toes and the ankles intact                Radiographs:  I have personally reviewed pertinent films in PACS and my interpretation is X-rays of the left knee are reviewed which show some early osteoarthritis of the patellofemoral joint  No other fractures or dislocations are noted

## 2018-08-01 NOTE — ASSESSMENT & PLAN NOTE
27-year-old female with bilateral patellofemoral pain, secondary to some early osteoarthritis  I reviewed the radiographs with her, discussed the diagnosis  We discussed the importance of anti-inflammatories, icing, low-impact exercise, and weight management  We will get her going with physical therapy  I will see her back as needed

## 2018-08-23 ENCOUNTER — TELEPHONE (OUTPATIENT)
Dept: OBGYN CLINIC | Facility: CLINIC | Age: 28
End: 2018-08-23

## 2018-08-23 NOTE — TELEPHONE ENCOUNTER
Call from patient  Call back # [phone]       Patient stated Meloxicam isn't working, she is requesting a stronger medication       [address] [facility] [place], [place], [address] , [place], [place] [address] (Phone)  [phone] (Fax)

## 2018-08-24 ENCOUNTER — TELEPHONE (OUTPATIENT)
Dept: FAMILY MEDICINE CLINIC | Facility: HOSPITAL | Age: 28
End: 2018-08-24

## 2018-08-24 DIAGNOSIS — M25.561 CHRONIC PAIN OF BOTH KNEES: Primary | ICD-10-CM

## 2018-08-24 DIAGNOSIS — G89.29 CHRONIC PAIN OF BOTH KNEES: Primary | ICD-10-CM

## 2018-08-24 DIAGNOSIS — M25.562 CHRONIC PAIN OF BOTH KNEES: Primary | ICD-10-CM

## 2018-08-24 NOTE — TELEPHONE ENCOUNTER
Pt has been going to Pt  For 3-4 weeks  Dr Neeraj Morales said if she is still in pain then she needs to call her PCP for stronger pain meds  I told her that I would send this note to Dr Ashvin Washburn and she would address on Monday  Was that OK to tell her or do you want to prescribe something stronger? Not sure if Dr Ashvin Washburn will want to see her  She seemed fine with us calling back on Monday  She was told to go to ER or Urgent care if it gets worse  Please advise

## 2018-08-24 NOTE — TELEPHONE ENCOUNTER
Called and spoke to patient to let her know  Patient states that she has been taking Meloxicam and Aleve and claims it is not helping  Patients states that she will call her PCP

## 2018-08-24 NOTE — TELEPHONE ENCOUNTER
We will not prescribe stronger pain medication for this condition  She should continue icing and doing PT  She can add Tylenol Arthritis form which is 2 tabs twice a day along with Meloxicam  She can follow up with PCP if she needs to discuss being on stronger pain medication

## 2018-08-24 NOTE — TELEPHONE ENCOUNTER
Pt saw us, we sent her to ortho, ortho sent her to PT for knee arthritis    We had rx'd meloxicam for pain and inflamation and shes been taking alieve on top,  Neither are working she would like to know if she can be rx'd something stronger

## 2018-08-27 NOTE — TELEPHONE ENCOUNTER
I suggest she comes in for follow up if her pain is that severe so we can reexamine her and go over further treatment options  Please offer her an appointment

## 2018-08-27 NOTE — TELEPHONE ENCOUNTER
Patient was called and was given an appt schedule for tomorrow 8/28/2018 @455a and she is also asking to get new xrays done on both knees  I did advise her that she would not get stronger pain medicine but I was able to bring her in

## 2018-08-27 NOTE — TELEPHONE ENCOUNTER
I will not rx anything stronger for an issue orthopedics is treating  I do think she should have blood work done to be sure knee pain isn't from any other type of arthritis, lyme, etc so I am ordering labs now

## 2018-08-27 NOTE — TELEPHONE ENCOUNTER
Patient is calling stating that she is having a lot of knee pain  She states she can hardly walk or do PT some days due to pain  She called her family doctor and they stated they will not give any pain medication since we are treating her  She wants to get something stronger and is very angry that we would not write this last week  Per patient, she was told that she could get something stronger if she needed it   Please advise

## 2018-08-27 NOTE — TELEPHONE ENCOUNTER
Pt doesn't want to have the bloodwork done  She KNOWS its the arthritis  She will try calling ortho back again

## 2019-02-23 ENCOUNTER — OFFICE VISIT (OUTPATIENT)
Dept: FAMILY MEDICINE CLINIC | Facility: HOSPITAL | Age: 29
End: 2019-02-23
Payer: COMMERCIAL

## 2019-02-23 VITALS
TEMPERATURE: 97.8 F | BODY MASS INDEX: 29.99 KG/M2 | WEIGHT: 180 LBS | DIASTOLIC BLOOD PRESSURE: 80 MMHG | SYSTOLIC BLOOD PRESSURE: 128 MMHG | HEIGHT: 65 IN | HEART RATE: 83 BPM

## 2019-02-23 DIAGNOSIS — J01.90 ACUTE NON-RECURRENT SINUSITIS, UNSPECIFIED LOCATION: Primary | ICD-10-CM

## 2019-02-23 DIAGNOSIS — F41.9 ANXIETY: ICD-10-CM

## 2019-02-23 PROCEDURE — 3008F BODY MASS INDEX DOCD: CPT | Performed by: FAMILY MEDICINE

## 2019-02-23 PROCEDURE — 99213 OFFICE O/P EST LOW 20 MIN: CPT | Performed by: FAMILY MEDICINE

## 2019-02-23 RX ORDER — PROMETHAZINE HYDROCHLORIDE AND CODEINE PHOSPHATE 6.25; 1 MG/5ML; MG/5ML
5 SYRUP ORAL EVERY 4 HOURS PRN
Qty: 118 ML | Refills: 1 | Status: SHIPPED | OUTPATIENT
Start: 2019-02-23 | End: 2019-08-16

## 2019-02-23 RX ORDER — AMOXICILLIN AND CLAVULANATE POTASSIUM 875; 125 MG/1; MG/1
1 TABLET, FILM COATED ORAL EVERY 12 HOURS SCHEDULED
Qty: 14 TABLET | Refills: 0 | Status: SHIPPED | OUTPATIENT
Start: 2019-02-23 | End: 2019-03-02

## 2019-02-23 RX ORDER — LORAZEPAM 1 MG/1
TABLET ORAL
Qty: 30 TABLET | Refills: 0 | Status: SHIPPED | OUTPATIENT
Start: 2019-02-23 | End: 2019-08-19 | Stop reason: SDUPTHER

## 2019-06-19 ENCOUNTER — CLINICAL SUPPORT (OUTPATIENT)
Dept: FAMILY MEDICINE CLINIC | Facility: HOSPITAL | Age: 29
End: 2019-06-19
Payer: COMMERCIAL

## 2019-06-19 DIAGNOSIS — Z11.1 ENCOUNTER FOR PPD TEST: Primary | ICD-10-CM

## 2019-06-19 DIAGNOSIS — F41.9 ANXIETY: ICD-10-CM

## 2019-06-19 PROCEDURE — 86580 TB INTRADERMAL TEST: CPT

## 2019-06-21 ENCOUNTER — CLINICAL SUPPORT (OUTPATIENT)
Dept: FAMILY MEDICINE CLINIC | Facility: HOSPITAL | Age: 29
End: 2019-06-21
Payer: COMMERCIAL

## 2019-06-21 DIAGNOSIS — Z11.1 ENCOUNTER FOR PPD SKIN TEST READING: Primary | ICD-10-CM

## 2019-06-21 LAB
INDURATION: 0 MM
TB SKIN TEST: NEGATIVE

## 2019-06-21 RX ORDER — LORAZEPAM 1 MG/1
TABLET ORAL
Qty: 30 TABLET | Refills: 0 | OUTPATIENT
Start: 2019-06-21

## 2019-06-24 NOTE — TELEPHONE ENCOUNTER
Please notify patient her lorazepam was refilled and she should schedule an anxiety follow up/med check to continue further refills

## 2019-06-26 ENCOUNTER — CLINICAL SUPPORT (OUTPATIENT)
Dept: FAMILY MEDICINE CLINIC | Facility: HOSPITAL | Age: 29
End: 2019-06-26
Payer: COMMERCIAL

## 2019-06-26 DIAGNOSIS — Z11.1 ENCOUNTER FOR PPD TEST: Primary | ICD-10-CM

## 2019-06-26 PROCEDURE — 86580 TB INTRADERMAL TEST: CPT | Performed by: FAMILY MEDICINE

## 2019-06-28 ENCOUNTER — CLINICAL SUPPORT (OUTPATIENT)
Dept: FAMILY MEDICINE CLINIC | Facility: HOSPITAL | Age: 29
End: 2019-06-28

## 2019-06-28 DIAGNOSIS — Z11.1 ENCOUNTER FOR PPD SKIN TEST READING: Primary | ICD-10-CM

## 2019-06-28 LAB
INDURATION: 0 MM
TB SKIN TEST: NEGATIVE

## 2019-06-28 PROCEDURE — 99024 POSTOP FOLLOW-UP VISIT: CPT | Performed by: FAMILY MEDICINE

## 2019-08-09 ENCOUNTER — TELEPHONE (OUTPATIENT)
Dept: FAMILY MEDICINE CLINIC | Facility: HOSPITAL | Age: 29
End: 2019-08-09

## 2019-08-09 NOTE — TELEPHONE ENCOUNTER
I will write for her this time, but please tell her for future notes she will need to be seen at time of illness

## 2019-08-16 ENCOUNTER — OFFICE VISIT (OUTPATIENT)
Dept: FAMILY MEDICINE CLINIC | Facility: HOSPITAL | Age: 29
End: 2019-08-16
Payer: COMMERCIAL

## 2019-08-16 VITALS
SYSTOLIC BLOOD PRESSURE: 132 MMHG | BODY MASS INDEX: 29.32 KG/M2 | DIASTOLIC BLOOD PRESSURE: 78 MMHG | HEIGHT: 65 IN | HEART RATE: 101 BPM | TEMPERATURE: 98.6 F | WEIGHT: 176 LBS

## 2019-08-16 DIAGNOSIS — E03.4 HYPOTHYROIDISM DUE TO ACQUIRED ATROPHY OF THYROID: ICD-10-CM

## 2019-08-16 DIAGNOSIS — F41.8 DEPRESSION WITH ANXIETY: ICD-10-CM

## 2019-08-16 DIAGNOSIS — Z13.220 SCREENING FOR CHOLESTEROL LEVEL: ICD-10-CM

## 2019-08-16 DIAGNOSIS — E07.89 THYROID FULLNESS: ICD-10-CM

## 2019-08-16 DIAGNOSIS — Z00.00 ANNUAL PHYSICAL EXAM: Primary | ICD-10-CM

## 2019-08-16 DIAGNOSIS — E66.3 OVERWEIGHT (BMI 25.0-29.9): ICD-10-CM

## 2019-08-16 PROCEDURE — 99395 PREV VISIT EST AGE 18-39: CPT | Performed by: INTERNAL MEDICINE

## 2019-08-16 NOTE — PATIENT INSTRUCTIONS
Wellness Visit for Adults   AMBULATORY CARE:   A wellness visit  is when you see your healthcare provider to get screened for health problems  You can also get advice on how to stay healthy  Write down your questions so you remember to ask them  Ask your healthcare provider how often you should have a wellness visit  What happens at a wellness visit:  Your healthcare provider will ask about your health, and your family history of health problems  This includes high blood pressure, heart disease, and cancer  He or she will ask if you have symptoms that concern you, if you smoke, and about your mood  You may also be asked about your intake of medicines, supplements, food, and alcohol  Any of the following may be done:  · Your weight  will be checked  Your height may also be checked so your body mass index (BMI) can be calculated  Your BMI shows if you are at a healthy weight  · Your blood pressure  and heart rate will be checked  Your temperature may also be checked  · Blood and urine tests  may be done  Blood tests may be done to check your cholesterol levels  Abnormal cholesterol levels increase your risk for heart disease and stroke  You may also need a blood or urine test to check for diabetes if you are at increased risk  Urine tests may be done to look for signs of an infection or kidney disease  · A physical exam  includes checking your heartbeat and lungs with a stethoscope  Your healthcare provider may also check your skin to look for sun damage  · Screening tests  may be recommended  A screening test is done to check for diseases that may not cause symptoms  The screening tests you may need depend on your age, gender, family history, and lifestyle habits  For example, colorectal screening may be recommended if you are 48years old or older  Screening tests you need if you are a woman:   · A Pap smear  is used to screen for cervical cancer   Pap smears are usually done every 3 to 5 years depending on your age  You may need them more often if you have had abnormal Pap smear test results in the past  Ask your healthcare provider how often you should have a Pap smear  · A mammogram  is an x-ray of your breasts to screen for breast cancer  Experts recommend mammograms every 2 years starting at age 48 years  You may need a mammogram at age 52 years or younger if you have an increased risk for breast cancer  Talk to your healthcare provider about when you should start having mammograms and how often you need them  Vaccines you may need:   · Get an influenza vaccine  every year  The influenza vaccine protects you from the flu  Several types of viruses cause the flu  The viruses change over time, so new vaccines are made each year  · Get a tetanus-diphtheria (Td) booster vaccine  every 10 years  This vaccine protects you against tetanus and diphtheria  Tetanus is a severe infection that may cause painful muscle spasms and lockjaw  Diphtheria is a severe bacterial infection that causes a thick covering in the back of your mouth and throat  · Get a human papillomavirus (HPV) vaccine  if you are female and aged 23 to 32 or male 23 to 24 and never received it  This vaccine protects you from HPV infection  HPV is the most common infection spread by sexual contact  HPV may also cause vaginal, penile, and anal cancers  · Get a pneumococcal vaccine  if you are aged 72 years or older  The pneumococcal vaccine is an injection given to protect you from pneumococcal disease  Pneumococcal disease is an infection caused by pneumococcal bacteria  The infection may cause pneumonia, meningitis, or an ear infection  · Get a shingles vaccine  if you are aged 61 or older, even if you have had shingles before  The shingles vaccine is an injection to protect you from the varicella-zoster virus  This is the same virus that causes chickenpox   Shingles is a painful rash that develops in people who had chickenpox or have been exposed to the virus  How to eat healthy:  My Plate is a model for planning healthy meals  It shows the types and amounts of foods that should go on your plate  Fruits and vegetables make up about half of your plate, and grains and protein make up the other half  A serving of dairy is included on the side of your plate  The amount of calories and serving sizes you need depends on your age, gender, weight, and height  Examples of healthy foods are listed below:  · Eat a variety of vegetables  such as dark green, red, and orange vegetables  You can also include canned vegetables low in sodium (salt) and frozen vegetables without added butter or sauces  · Eat a variety of fresh fruits , canned fruit in 100% juice, frozen fruit, and dried fruit  · Include whole grains  At least half of the grains you eat should be whole grains  Examples include whole-wheat bread, wheat pasta, brown rice, and whole-grain cereals such as oatmeal     · Eat a variety of protein foods such as seafood (fish and shellfish), lean meat, and poultry without skin (turkey and chicken)  Examples of lean meats include pork leg, shoulder, or tenderloin, and beef round, sirloin, tenderloin, and extra lean ground beef  Other protein foods include eggs and egg substitutes, beans, peas, soy products, nuts, and seeds  · Choose low-fat dairy products such as skim or 1% milk or low-fat yogurt, cheese, and cottage cheese  · Limit unhealthy fats  such as butter, hard margarine, and shortening  Exercise:  Exercise at least 30 minutes per day on most days of the week  Some examples of exercise include walking, biking, dancing, and swimming  You can also fit in more physical activity by taking the stairs instead of the elevator or parking farther away from stores  Include muscle strengthening activities 2 days each week  Regular exercise provides many health benefits   It helps you manage your weight, and decreases your risk for type 2 diabetes, heart disease, stroke, and high blood pressure  Exercise can also help improve your mood  Ask your healthcare provider about the best exercise plan for you  General health and safety guidelines:   · Do not smoke  Nicotine and other chemicals in cigarettes and cigars can cause lung damage  Ask your healthcare provider for information if you currently smoke and need help to quit  E-cigarettes or smokeless tobacco still contain nicotine  Talk to your healthcare provider before you use these products  · Limit alcohol  A drink of alcohol is 12 ounces of beer, 5 ounces of wine, or 1½ ounces of liquor  · Lose weight, if needed  Being overweight increases your risk of certain health conditions  These include heart disease, high blood pressure, type 2 diabetes, and certain types of cancer  · Protect your skin  Do not sunbathe or use tanning beds  Use sunscreen with a SPF 15 or higher  Apply sunscreen at least 15 minutes before you go outside  Reapply sunscreen every 2 hours  Wear protective clothing, hats, and sunglasses when you are outside  · Drive safely  Always wear your seatbelt  Make sure everyone in your car wears a seatbelt  A seatbelt can save your life if you are in an accident  Do not use your cell phone when you are driving  This could distract you and cause an accident  Pull over if you need to make a call or send a text message  · Practice safe sex  Use latex condoms if are sexually active and have more than one partner  Your healthcare provider may recommend screening tests for sexually transmitted infections (STIs)  · Wear helmets, lifejackets, and protective gear  Always wear a helmet when you ride a bike or motorcycle, go skiing, or play sports that could cause a head injury  Wear protective equipment when you play sports  Wear a lifejacket when you are on a boat or doing water sports    © 2017 [employer] [employer] Information is for End User's use only and may not be sold, redistributed or otherwise used for commercial purposes  All illustrations and images included in CareNotes® are the copyrighted property of [employer]  or [employer]  The above information is an  only  It is not intended as medical advice for individual conditions or treatments  Talk to your doctor, nurse or pharmacist before following any medical regimen to see if it is safe and effective for you  Weight Management   AMBULATORY CARE:   Why it is important to manage your weight:  Being overweight increases your risk of health conditions such as heart disease, high blood pressure, type 2 diabetes, and certain types of cancer  It can also increase your risk for osteoarthritis, sleep apnea, and other respiratory problems  Aim for a slow, steady weight loss  Even a small amount of weight loss can lower your risk of health problems  How to lose weight safely:  A safe and healthy way to lose weight is to eat fewer calories and get regular exercise  You can lose up about 1 pound a week by decreasing the number of calories you eat by 500 calories each day  You can decrease calories by eating smaller portion sizes or by cutting out high-calorie foods  Read labels to find out how many calories are in the foods you eat  You can also burn calories with exercise such as walking, swimming, or biking  You will be more likely to keep weight off if you make these changes part of your lifestyle  Healthy meal plan for weight management:  A healthy meal plan includes a variety of foods, contains fewer calories, and helps you stay healthy  A healthy meal plan includes the following:  · Eat whole-grain foods more often  A healthy meal plan should contain fiber  Fiber is the part of grains, fruits, and vegetables that is not broken down by your body  Whole-grain foods are healthy and provide extra fiber in your diet   Some examples of whole-grain foods are whole-wheat breads and pastas, oatmeal, brown rice, and bulgur  · Eat a variety of vegetables every day  Include dark, leafy greens such as spinach, kale, ad greens, and mustard greens  Eat yellow and orange vegetables such as carrots, sweet potatoes, and winter squash  · Eat a variety of fruits every day  Choose fresh or canned fruit (canned in its own juice or light syrup) instead of juice  Fruit juice has very little or no fiber  · Eat low-fat dairy foods  Drink fat-free (skim) milk or 1% milk  Eat fat-free yogurt and low-fat cottage cheese  Try low-fat cheeses such as mozzarella and other reduced-fat cheeses  · Choose meat and other protein foods that are low in fat  Choose beans or other legumes such as split peas or lentils  Choose fish, skinless poultry (chicken or turkey), or lean cuts of red meat (beef or pork)  Before you cook meat or poultry, cut off any visible fat  · Use less fat and oil  Try baking foods instead of frying them  Add less fat, such as margarine, sour cream, regular salad dressing and mayonnaise to foods  Eat fewer high-fat foods  Some examples of high-fat foods include french fries, doughnuts, ice cream, and cakes  · Eat fewer sweets  Limit foods and drinks that are high in sugar  This includes candy, cookies, regular soda, and sweetened drinks  Ways to decrease calories:   · Eat smaller portions  ¨ Use a small plate with smaller servings  ¨ Do not eat second helpings  ¨ When you eat at a restaurant, ask for a box and place half of your meal in the box before you eat  ¨ Share an entrée with someone else  · Replace high-calorie snacks with healthy, low-calorie snacks  ¨ Choose fresh fruit, vegetables, fat-free rice cakes, or air-popped popcorn instead of potato chips, nuts, or chocolate  ¨ Choose water or calorie-free drinks instead of soda or sweetened drinks  · Eat regular meals  Skipping meals can lead to overeating later in the day   Eat a healthy snack in place of a meal if you do not have time to eat a regular meal      · Do not shop for groceries when you are hungry  You may be more likely to make unhealthy food choices  Take a grocery list of healthy foods and shop after you have eaten  Exercise:  Exercise at least 30 minutes per day on most days of the week  Some examples of exercise include walking, biking, dancing, and swimming  You can also fit in more physical activity by taking the stairs instead of the elevator or parking farther away from stores  Ask your healthcare provider about the best exercise plan for you  Other things to consider as you try to lose weight:   · Be aware of situations that may give you the urge to overeat, such as eating while watching television  Find ways to avoid these situations  For example, read a book, go for a walk, or do crafts  · Meet with a weight loss support group or friends who are also trying to lose weight  This may help you stay motivated to continue working on your weight loss goals  © 2017 [address] [employer] [employer] Information is for End User's use only and may not be sold, redistributed or otherwise used for commercial purposes  All illustrations and images included in CareNotes® are the copyrighted property of [employer]  or [employer]  The above information is an  only  It is not intended as medical advice for individual conditions or treatments  Talk to your doctor, nurse or pharmacist before following any medical regimen to see if it is safe and effective for you  Depression   AMBULATORY CARE:   Depression  is a medical condition that causes feelings of sadness or hopelessness that do not go away  Depression may cause you to lose interest in things you used to enjoy  These feelings may interfere with your daily life    Common symptoms include the following:   · Appetite changes, or weight gain or loss    · Trouble going to sleep or staying asleep, or sleeping too much    · Fatigue or lack of energy    · Feeling restless, irritable, or withdrawn    · Feeling worthless, hopeless, discouraged, or guilty    · Trouble concentrating, remembering things, doing daily tasks, or making decisions    · Thoughts about hurting or killing yourself  Call 911 for any of the following:   · You think about harming yourself or someone else  Contact your healthcare provider if:   · Your symptoms do not improve  · You cannot make it to your next appointment  · You have new symptoms  · You have questions or concerns about your condition or care  Treatment for depression  may include medicine to improve or balance your mood  Therapy may also be used to treat your depression  A therapist will help you learn to cope with your thoughts and feelings  Therapy can be done alone or in a group  It may also be done with family members or a significant other  Self-care:   · Get regular physical activity  Try to exercise for 30 minutes, 3 to 5 days a week  Work with your healthcare provider to develop an exercise plan that you enjoy  Physical activity may improve your symptoms  · Get enough sleep  Create a routine to help you relax before bed  You can listen to music, read, or do yoga  Try to go to bed and wake up at the same time every day  Sleep is important for emotional health  · Eat a variety of healthy foods from all of the food groups  A healthy meal plan is low in fat, salt, and added sugar  Ask your healthcare provider for more information about a meal plan that is right for you  · Do not drink alcohol or use drugs  Alcohol and drugs can make your symptoms worse  Follow up with your healthcare provider as directed: Your healthcare provider will monitor your progress at follow-up visits  He or she will also monitor your medicine if you take antidepressants  Your healthcare provider will ask if the medicine is helping   Tell him or her about any side effects or problems you may have with your medicine  The type or amount of medicine may need to be changed  Write down your questions so you remember to ask them during your visits  © 2017 [address] [employer] Information is for End User's use only and may not be sold, redistributed or otherwise used for commercial purposes  All illustrations and images included in CareNotes® are the copyrighted property of [employer]  or [employer] Jim[employer]  The above information is an  only  It is not intended as medical advice for individual conditions or treatments  Talk to your doctor, nurse or pharmacist before following any medical regimen to see if it is safe and effective for you

## 2019-08-16 NOTE — ASSESSMENT & PLAN NOTE
Scored very high on depression screening - pt using lorazepam 1/2 tab 2 x's a week, d/w pt that lorazepam is for anxiety and is short acting and can be addictive/sedating/habit forming and does nothing for depression, discussed + depression screening and benefit of daily preventive mood med - pt deferring for now - will re-eval in 3 mos - call with new/worse symptoms

## 2019-08-16 NOTE — PROGRESS NOTES
[facility] Physician Lisa Guerra MD    NAME: Vazquez Galvez  AGE: 30 y o  SEX: female  : 1990     DATE: 2019     Assessment and Plan:     Problem List Items Addressed This Visit        Endocrine    Hypothyroidism due to acquired atrophy of thyroid     Order for TFT's given as well as thyroid US, con't current levothyroxine for now         Relevant Orders    Comprehensive metabolic panel    TSH, 3rd generation    T4, free       Other    Depression with anxiety     Scored very high on depression screening - pt using lorazepam 1/2 tab 2 x's a week, d/w pt that lorazepam is for anxiety and is short acting and can be addictive/sedating/habit forming and does nothing for depression, discussed + depression screening and benefit of daily preventive mood med - pt deferring for now - will re-eval in 3 mos - call with new/worse symptoms         Relevant Orders    Comprehensive metabolic panel      Other Visit Diagnoses     Annual physical exam    -  Primary    Overweight (BMI 25 0-29  9)        BMI 29 0-29 9,adult        Diet/exercise/wgt loss encouraged    Screening for cholesterol level        Relevant Orders    Comprehensive metabolic panel    Lipid panel    Thyroid fullness        Relevant Orders    US thyroid          Immunizations and preventive care screenings were discussed with patient today  Appropriate education was printed on patient's after visit summary  Counseling:  BMI Counseling: Body mass index is 29 29 kg/m²  Discussed the patient's BMI with her  The BMI is above average  BMI counseling and education was provided to the patient  Nutrition recommendations include reducing portion sizes, 3-5 servings of fruits/vegetables daily, consuming healthier snacks, moderation in carbohydrate intake, increasing intake of lean protein and reducing intake of saturated fat and trans fat   Exercise recommendations include moderate aerobic physical activity for 150 minutes/week and exercising 3-5 times per week  Alcohol/drug use: discussed moderation in alcohol intake and avoidance of illicit drug use  Dental Health: discussed importance of regular tooth brushing, flossing, and dental visits  · Injury prevention: discussed safety/seat belts, safety helmets, smoke detectors, carbon dioxide detectors, and smoking near bedding or upholstery  · Cervical cancer screening: PAP 2015 with [facility] - urged to f/u as should be done every 3 yrs - pt to call and make appt  · Breast cancer screening to start at age 36    Return in about 3 months (around 2019) for Recheck - 40 min  Chief Complaint:     Chief Complaint   Patient presents with    Annual Exam      History of Present Illness:     Adult Annual Physical   Patient here for a comprehensive physical exam  The patient reports no problems  She scored very high on her depression screening  She is not on a daily mood/antidepressant medication  She has lorazepam that she uses as needed - 1/2 tab 2 x's a week,  she notes some intermittent mood issues d/t "a lot going on", she denies current SI but does have some panic attacks intermittently    Diet and Physical Activity  · Diet/Nutrition: poor diet and limited fruits/vegetables  · Exercise: no formal exercise     · BMI reviewed - wgt down 12 lbs from 2018     Depression Screening  PHQ-9 Depression Screening    PHQ-9:    Frequency of the following problems over the past two weeks:       Little interest or pleasure in doing things:  3 - nearly every day  Feeling down, depressed, or hopeless:  2 - more than half the days  Trouble falling or staying asleep, or sleeping too much:  3 - nearly every day  Feeling tired or having little energy:  3 - nearly every day  Poor appetite or overeatin - several days  Feeling bad about yourself - or that you are a failure or have let yourself or your family down:  3 - nearly every day  Trouble concentrating on things, such as reading the newspaper or watching television:  0 - not at all  Moving or speaking so slowly that other people could have noticed  Or the opposite - being so fidgety or restless that you have been moving around a lot more than usual:  0 - not at all  Thoughts that you would be better off dead, or of hurting yourself in some way:  1 - several days  PHQ-2 Score:  5  PHQ-9 Score:  16       General Health  · Sleep: sleeps poorly, gets 4-6 hours of sleep on average, snores loudly and unrefreshing sleep  · Hearing: normal - bilateral   · Vision: goes for regular eye exams and wears glasses and contacts  · Dental: regular dental visits, brushes teeth twice daily and does not floss  /GYN Health  · Last menstrual period: end July - regular intervals but heavy flow early in cycle  · Contraceptive method: IUD placement  · History of STDs?: no   · GYN care at [address] [place] [address] - had IUD placed approx 2 yrs ago, last PAP 2015 - no h/o abnormal PAP's noted  · MGM with breast CA after age 46      Review of Systems:     Review of Systems   Constitutional: Negative for chills, fatigue, fever and unexpected weight change  HENT: Negative for congestion and sore throat  Eyes: Negative for pain and visual disturbance  Respiratory: Negative for cough, shortness of breath and wheezing  Cardiovascular: Negative for chest pain, palpitations and leg swelling  Gastrointestinal: Negative for abdominal pain, blood in stool, constipation, diarrhea and nausea  Endocrine: Negative for polydipsia and polyuria  Genitourinary: Negative for difficulty urinating and dysuria  Musculoskeletal: Positive for arthralgias  Negative for back pain and neck pain  Skin: Negative for rash and wound  Neurological: Negative for dizziness, light-headedness and headaches  Hematological: Negative for adenopathy  Psychiatric/Behavioral: Positive for dysphoric mood and sleep disturbance  Negative for behavioral problems, confusion and suicidal ideas   The patient is nervous/anxious         Past Medical History:     Past Medical History:   Diagnosis Date    Anxiety     Disease of thyroid gland     Insomnia     resolved 04/06/2015    Mild cervical dysplasia     resolved 04/06/2015    Substance abuse ([facility] [address] )     Tension headache     resolved 10/08/2016      Past Surgical History:     Past Surgical History:   Procedure Laterality Date    CHOLECYSTECTOMY LAPAROSCOPIC N/A 7/20/2016    Procedure: CHOLECYSTECTOMY LAPAROSCOPIC;  Surgeon: Jia Jacobs MD;  Location: [facility];  Service:    [facility] THYROID ANTIBODIES PANEL (HISTORICAL)      positive resolved 10/08/2016      Social History:     Social History     Socioeconomic History    Marital status: Single     Spouse name: None    Number of children: 4    Years of education: None    Highest education level: None   Occupational History    None   Social Needs    Financial resource strain: None    Food insecurity:     Worry: None     Inability: None    Transportation needs:     Medical: None     Non-medical: None   Tobacco Use    Smoking status: Current Every Day Smoker    Smokeless tobacco: Never Used    Tobacco comment: Has an appointment to see PMD   Substance and Sexual Activity    Alcohol use: No     Comment: (history)    Drug use: No     Comment: crack cocaine use clean x 9 years per allscripts    Sexual activity: Yes   Lifestyle    Physical activity:     Days per week: None     Minutes per session: None    Stress: None   Relationships    Social connections:     Talks on phone: None     Gets together: None     Attends [employer] witness service: None     Active member of club or organization: None     Attends meetings of clubs or organizations: None     Relationship status: None    Intimate partner violence:     Fear of current or ex partner: None     Emotionally abused: None     Physically abused: None     Forced sexual activity: None   Other Topics Concern    None   Social History Narrative    Always uses seat belt    Caffeine use    No Christianity beliefs    Under stress      Family History:     Family History   Problem Relation Age of Onset    Depression Mother       Current Medications:     Current Outpatient Medications   Medication Sig Dispense Refill    adalimumab (HUMIRA) 20 mg/0 4 mL PSKT injection Inject 20 mg under the skin once      hydrOXYzine HCL (ATARAX) 25 mg tablet       Levothyroxine Sodium 75 MCG CAPS Take 75 mcg by mouth      LORazepam (ATIVAN) 1 mg tablet Take 1/2-1 tablet twice daily as needed for anxiety 30 tablet 0     No current facility-administered medications for this visit  Allergies: Allergies   Allergen Reactions    Clarithromycin Rash     Reaction Date: [id number];  Biaxin      Physical Exam:     /78 (BP Location: Right arm, Patient Position: Sitting, Cuff Size: Standard)   Pulse 101   Temp 98 6 °F (37 °C)   Ht 5' 5" (1 651 m)   Wt 79 8 kg (176 lb)   BMI 29 29 kg/m²     Physical Exam   Constitutional: She appears well-developed and well-nourished  No distress  HENT:   Head: Normocephalic and atraumatic  Eyes: Conjunctivae are normal  Right eye exhibits no discharge  Left eye exhibits no discharge  Neck: Neck supple  No tracheal deviation present  L thyroid lobe fullness, no nodules/masses   Cardiovascular: Normal rate, regular rhythm and normal heart sounds  Exam reveals no friction rub  No murmur heard  Pulmonary/Chest: Effort normal and breath sounds normal  No respiratory distress  She has no wheezes  She has no rales  Abdominal: Soft  She exhibits no distension  There is no tenderness  There is no rebound and no guarding  Musculoskeletal: She exhibits no edema  Lymphadenopathy:     She has no cervical adenopathy  Neurological: She is alert  She exhibits normal muscle tone  Skin: Skin is warm and dry  No rash noted  Psychiatric: She has a normal mood and affect  Her behavior is normal    Nursing note and vitals reviewed        Aden Wilkins Brie Granados MD  Depression Screening Follow-up Plan: Patient's depression screening was positive with a PHQ-2 score of 5  Their PHQ-9 score was 16  Patient declines further evaluation by mental health professional and/or medications  Patient assessed for underlying major depression  They have no active suicidal ideations  Brief counseling provided and recommend additional follow-up/re-evaluation at next office visit

## 2019-08-19 DIAGNOSIS — F41.9 ANXIETY: ICD-10-CM

## 2019-08-19 RX ORDER — LORAZEPAM 1 MG/1
TABLET ORAL
Qty: 30 TABLET | Refills: 0 | OUTPATIENT
Start: 2019-08-19

## 2019-08-19 RX ORDER — LORAZEPAM 1 MG/1
TABLET ORAL
Qty: 30 TABLET | Refills: 0 | Status: SHIPPED | OUTPATIENT
Start: 2019-08-19 | End: 2020-11-24 | Stop reason: SDUPTHER

## 2019-09-27 ENCOUNTER — OFFICE VISIT (OUTPATIENT)
Dept: FAMILY MEDICINE CLINIC | Facility: HOSPITAL | Age: 29
End: 2019-09-27
Payer: COMMERCIAL

## 2019-09-27 VITALS
DIASTOLIC BLOOD PRESSURE: 78 MMHG | WEIGHT: 177.2 LBS | HEIGHT: 64 IN | HEART RATE: 50 BPM | SYSTOLIC BLOOD PRESSURE: 110 MMHG | TEMPERATURE: 97.6 F | OXYGEN SATURATION: 97 % | BODY MASS INDEX: 30.25 KG/M2

## 2019-09-27 DIAGNOSIS — J06.9 ACUTE UPPER RESPIRATORY INFECTION: Primary | ICD-10-CM

## 2019-09-27 DIAGNOSIS — Z23 ENCOUNTER FOR IMMUNIZATION: ICD-10-CM

## 2019-09-27 DIAGNOSIS — F17.210 CIGARETTE NICOTINE DEPENDENCE WITHOUT COMPLICATION: ICD-10-CM

## 2019-09-27 DIAGNOSIS — Z12.4 SCREENING FOR CERVICAL CANCER: ICD-10-CM

## 2019-09-27 LAB — S PYO AG THROAT QL: NEGATIVE

## 2019-09-27 PROCEDURE — 90471 IMMUNIZATION ADMIN: CPT | Performed by: NURSE PRACTITIONER

## 2019-09-27 PROCEDURE — 3008F BODY MASS INDEX DOCD: CPT | Performed by: NURSE PRACTITIONER

## 2019-09-27 PROCEDURE — 90686 IIV4 VACC NO PRSV 0.5 ML IM: CPT | Performed by: NURSE PRACTITIONER

## 2019-09-27 PROCEDURE — 99214 OFFICE O/P EST MOD 30 MIN: CPT | Performed by: NURSE PRACTITIONER

## 2019-09-27 PROCEDURE — 87880 STREP A ASSAY W/OPTIC: CPT | Performed by: NURSE PRACTITIONER

## 2019-09-27 RX ORDER — NICOTINE 21 MG/24HR
1 PATCH, TRANSDERMAL 24 HOURS TRANSDERMAL EVERY 24 HOURS
Qty: 28 PATCH | Refills: 2 | Status: SHIPPED | OUTPATIENT
Start: 2019-09-27 | End: 2019-12-05 | Stop reason: SDUPTHER

## 2019-09-27 NOTE — PROGRESS NOTES
Assessment/Plan:    No problem-specific Assessment & Plan notes found for this encounter  Diagnoses and all orders for this visit:    Acute upper respiratory infection  Comments:  rapid strep neg, will send cx to confirm; treat as viral w/rest, fluids, OTC meds prn; call if worse or persistent sx's    Cigarette nicotine dependence without complication  Comments:  rx issued to start nicoderm patches as requested  Orders:  -     nicotine (NICODERM CQ) 14 mg/24hr TD 24 hr patch; Place 1 patch on the skin every 24 hours    Screening for cervical cancer  -     Ambulatory referral to Obstetrics / Gynecology; Future    Encounter for immunization  -     influenza vaccine, [id number], quadrivalent, 0 5 mL, preservative-free, for adult and pediatric patients 6 mos+ (AFLURIA, FLUARIX, FLULAVAL, FLUZONE)      Flu shot given today  Subjective:      Patient ID: Quynh Lares is a 30 y o  female  Nausea w/ no vomiting, sore throat, and congestion that started Monday (4 days ago)  Currently just has congestion and sore throat  Had a temp on Wednesday of 99 7  Taking dayquil and nyquil, they slightly help but nyquil has been really helping with sleep  Smokes 1/2 a pack a day currently  Would be willing to try nicotine patches or gum to quit  The following portions of the patient's history were reviewed and updated as appropriate: allergies, current medications, past medical history, past social history, past surgical history and problem list     Review of Systems   Constitutional: Positive for appetite change (no appetite), chills, diaphoresis, fatigue and fever  Negative for activity change and unexpected weight change  HENT: Positive for congestion, postnasal drip (has stopped, was clear mucous), rhinorrhea (has stopped), sinus pain, sneezing and sore throat  Negative for dental problem, ear discharge, ear pain, sinus pressure, tinnitus and trouble swallowing      Eyes: Negative for pain, discharge, redness and itching  Respiratory: Positive for cough (productive cough with yellow mucous)  Negative for choking, chest tightness, shortness of breath and wheezing  Gastrointestinal: Positive for nausea  Negative for abdominal distention, abdominal pain, constipation, diarrhea and vomiting  Musculoskeletal: Negative for arthralgias and myalgias  Skin: Negative for color change, pallor, rash and wound  Allergic/Immunologic: Negative for environmental allergies, food allergies and immunocompromised state  Neurological: Positive for headaches (has been getting these before the cold symptoms and has been taking alieve with no success  )  Psychiatric/Behavioral: Negative for sleep disturbance  Objective:      /78 (Patient Position: Sitting, Cuff Size: Standard)   Pulse (!) 50   Temp 97 6 °F (36 4 °C) (Tympanic)   Ht 5' 4" (1 626 m)   Wt 80 4 kg (177 lb 3 2 oz)   SpO2 97%   BMI 30 42 kg/m²          Physical Exam   Constitutional: She is oriented to person, place, and time  She appears well-developed and well-nourished  HENT:   Head: Normocephalic and atraumatic  Right Ear: Hearing normal  A middle ear effusion is present  Left Ear: Hearing normal  A middle ear effusion is present  Mouth/Throat: Mucous membranes are normal  Mucous membranes are not pale, not dry and not cyanotic  Posterior oropharyngeal edema and posterior oropharyngeal erythema present  No oropharyngeal exudate or tonsillar abscesses  Tonsils are 2+ on the right  Tonsils are 2+ on the left  No tonsillar exudate  Eyes: Pupils are equal, round, and reactive to light  EOM are normal    Neck: Normal range of motion  Neck supple  No thyromegaly present  Cardiovascular: Normal rate, regular rhythm and normal heart sounds  Exam reveals no gallop and no friction rub  No murmur heard  Pulmonary/Chest: Effort normal and breath sounds normal  No stridor  No respiratory distress  She has no wheezes  She has no rhonchi  She has no rales  She exhibits no tenderness  Dry cough   Lymphadenopathy:     She has no cervical adenopathy  Neurological: She is alert and oriented to person, place, and time  Skin: Skin is warm and dry  No rash noted  She is not diaphoretic  No erythema  No pallor  Psychiatric: She has a normal mood and affect  Her behavior is normal    Vitals reviewed

## 2019-09-29 LAB — B-HEM STREP SPEC QL CULT: ABNORMAL

## 2019-09-30 DIAGNOSIS — J02.0 STREP PHARYNGITIS: Primary | ICD-10-CM

## 2019-09-30 RX ORDER — AMOXICILLIN 500 MG/1
1000 CAPSULE ORAL EVERY 12 HOURS SCHEDULED
Qty: 40 CAPSULE | Refills: 0 | Status: SHIPPED | OUTPATIENT
Start: 2019-09-30 | End: 2019-10-10

## 2019-12-05 ENCOUNTER — OFFICE VISIT (OUTPATIENT)
Dept: FAMILY MEDICINE CLINIC | Facility: HOSPITAL | Age: 29
End: 2019-12-05
Payer: COMMERCIAL

## 2019-12-05 VITALS
BODY MASS INDEX: 30.22 KG/M2 | HEART RATE: 94 BPM | DIASTOLIC BLOOD PRESSURE: 72 MMHG | SYSTOLIC BLOOD PRESSURE: 128 MMHG | HEIGHT: 64 IN | TEMPERATURE: 97.8 F | WEIGHT: 177 LBS

## 2019-12-05 DIAGNOSIS — F17.210 CIGARETTE NICOTINE DEPENDENCE WITHOUT COMPLICATION: ICD-10-CM

## 2019-12-05 DIAGNOSIS — E03.4 HYPOTHYROIDISM DUE TO ACQUIRED ATROPHY OF THYROID: Primary | ICD-10-CM

## 2019-12-05 DIAGNOSIS — F41.8 DEPRESSION WITH ANXIETY: ICD-10-CM

## 2019-12-05 DIAGNOSIS — G47.00 INSOMNIA, UNSPECIFIED TYPE: ICD-10-CM

## 2019-12-05 PROCEDURE — 3008F BODY MASS INDEX DOCD: CPT | Performed by: INTERNAL MEDICINE

## 2019-12-05 PROCEDURE — 99214 OFFICE O/P EST MOD 30 MIN: CPT | Performed by: INTERNAL MEDICINE

## 2019-12-05 RX ORDER — POLYETHYLENE GLYCOL 3350 17 G
2 POWDER IN PACKET (EA) ORAL AS NEEDED
Qty: 100 EACH | Refills: 2 | Status: SHIPPED | OUTPATIENT
Start: 2019-12-05 | End: 2020-04-14

## 2019-12-05 RX ORDER — NICOTINE 21 MG/24HR
1 PATCH, TRANSDERMAL 24 HOURS TRANSDERMAL EVERY 24 HOURS
Qty: 28 PATCH | Refills: 0 | Status: SHIPPED | OUTPATIENT
Start: 2019-12-05 | End: 2020-04-14

## 2019-12-05 NOTE — ASSESSMENT & PLAN NOTE
Clinically euthyroid but has not been adherent with labs and US - orders reprinted again and told pt she had to do BW before any further meds will be refilled, will call with results

## 2019-12-05 NOTE — ASSESSMENT & PLAN NOTE
Still smoking btw 1/2 ppd to 1 ppd, long term SE of smoking reviewed as was cessations options and SE, pt was given rx for patches last time but never started them - is interested in trying and wishes for new rx to be sent, d/w pt that she can use lozenges when cravings occur - call with any issues or SE

## 2019-12-05 NOTE — ASSESSMENT & PLAN NOTE
Depression greatly improved with anniversary of passing of grandparents coming and going and finally settled after moving, she still notes some anxiety intermittently but has been able to decrease her lorazepam use to less then once weekly, call with new/worse symptoms/SI/panic attacks

## 2019-12-05 NOTE — ASSESSMENT & PLAN NOTE
Improved and sleeping well most nights - still has some bad insomnia but much less frequent, call with new/worse symptoms

## 2019-12-05 NOTE — PROGRESS NOTES
Assessment/Plan:    Hypothyroidism due to acquired atrophy of thyroid  Clinically euthyroid but has not been adherent with labs and US - orders reprinted again and told pt she had to do BW before any further meds will be refilled, will call with results    Depression with anxiety  Depression greatly improved with anniversary of passing of grandparents coming and going and finally settled after moving, she still notes some anxiety intermittently but has been able to decrease her lorazepam use to less then once weekly, call with new/worse symptoms/SI/panic attacks    Insomnia  Improved and sleeping well most nights - still has some bad insomnia but much less frequent, call with new/worse symptoms    Nicotine dependence  Still smoking btw 1/2 ppd to 1 ppd, long term SE of smoking reviewed as was cessations options and SE, pt was given rx for patches last time but never started them - is interested in trying and wishes for new rx to be sent, d/w pt that she can use lozenges when cravings occur - call with any issues or SE       Diagnoses and all orders for this visit:    Hypothyroidism due to acquired atrophy of thyroid    Depression with anxiety    Insomnia, unspecified type    Cigarette nicotine dependence without complication  Comments:  rx issued to start nicoderm patches as requested  Orders:  -     nicotine (NICODERM CQ) 14 mg/24hr TD 24 hr patch; Place 1 patch on the skin every 24 hours  -     nicotine polacrilex (COMMIT) 2 MG lozenge; Apply 1 lozenge (2 mg total) to the mouth or throat as needed for smoking cessation      BW 6/17    PAP 5/15 - was going to [facility] - is agreeable to seeing Ko Madrigal for PAP    Pt had her flu vaccine this year already      Subjective:      Patient ID: Sotero Currie is a 30 y o  female  HPI Pt here for follow up appt    She never did her BW ordered at last appt to f/u on hypothyroidism on levothyroxine  She was given an order for thyroid US as well and that has not been done either  She is taking levothyroxine daily as directed  She notes no significant wgt changes/tremor/palp/fatigue/C/D  She is doing well with her mood  She has been able to decrease her lorazepam use and is not even using it once weekly  She notes the anniversary of her grandparents passing has come and gone and the moving has settled  She is feeling less overwhelmed/anxious  She notes no significant down/depressed /sad mood  Sleep is good most of the time  She con't to smoke btw 1/2 ppd to less then 1ppd  She notes no chronic cough/SOB/wheezing  She was given rx for patch but never tried it  She is interested in quitting and would like the rx sent again  Long term SE of smoking and cessation options were reviewed  BW 6/17    PAP 5/15    Pt had her flu vaccine this year already      Review of Systems   Constitutional: Negative for chills, fatigue, fever and unexpected weight change  HENT: Negative for congestion and sore throat  Eyes: Negative for pain and visual disturbance  Respiratory: Negative for cough, shortness of breath and wheezing  Cardiovascular: Negative for chest pain and palpitations  Gastrointestinal: Negative for abdominal pain, constipation, diarrhea and nausea  Endocrine: Negative for polydipsia and polyuria  Genitourinary: Negative for difficulty urinating and dysuria  Musculoskeletal: Positive for arthralgias and joint swelling  Negative for back pain and neck pain  Skin: Negative for rash and wound  Neurological: Negative for dizziness, light-headedness and headaches  Hematological: Negative for adenopathy  Psychiatric/Behavioral: Positive for sleep disturbance  Negative for behavioral problems, confusion and dysphoric mood  The patient is nervous/anxious            Objective:    /72 (BP Location: Right arm, Patient Position: Sitting, Cuff Size: Standard)   Pulse 94   Temp 97 8 °F (36 6 °C)   Ht 5' 4" (1 626 m)   Wt 80 3 kg (177 lb)   BMI 30 38 kg/m² Physical Exam   Constitutional: She appears well-developed and well-nourished  No distress  HENT:   Head: Normocephalic and atraumatic  Eyes: Conjunctivae are normal  Right eye exhibits no discharge  Left eye exhibits no discharge  Neck: Neck supple  No tracheal deviation present  Cardiovascular: Normal rate, regular rhythm and normal heart sounds  Exam reveals no friction rub  No murmur heard  Pulmonary/Chest: Effort normal and breath sounds normal  No respiratory distress  She has no wheezes  She has no rales  Abdominal: Soft  She exhibits no distension  There is no tenderness  There is no rebound and no guarding  Musculoskeletal: She exhibits no edema  Neurological: She is alert  She exhibits normal muscle tone  Skin: Skin is warm and dry  No rash noted  Psychiatric: She has a normal mood and affect  Her behavior is normal    Nursing note and vitals reviewed

## 2020-01-01 ENCOUNTER — TELEPHONE (OUTPATIENT)
Dept: OTHER | Facility: OTHER | Age: 30
End: 2020-01-01

## 2020-01-01 NOTE — TELEPHONE ENCOUNTER
Magdalena Skeleton 1990  CONFIDENTIALTY NOTICE: This fax transmission is intended only for the addressee  It contains information that is legally privileged,  confidential or otherwise protected from use or disclosure  If you are not the intended recipient, you are strictly prohibited from reviewing,  disclosing, copying using or disseminating any of this information or taking any action in reliance on or regarding this information  If you have  received this fax in error, please notify us immediately by telephone so that we can arrange for its return to us  Page:   Call Id: [id number]  Health Call  Standard Call Report  Health Call  Patient Name: Sara Parks  Gender: Female  : 1990  Age: 30 Y 5 M 21 D  Return Phone  Number: [phone] (Home)  Address: [place] [address]  City/State/[facility]: [facility] [address]  Practice Name: [facility] INTERNAL  MEDICINE  Practice Charged:  Physician:  [address] [place] [address] Name:  Relationship To  Patient: Self  Return Phone Number: [phone] (Home)  Presenting Problem: " I am having really bad pain in my  teeth "  Service Type: Triage  Charged Service 1: N/A  Pharmacy Name and  Number:  Nurse Assessment  Nurse: Marek Mejía RN, Diony Marie Date/Time: 2020 4:16:50 PM  Type of assessment required:  ---General (Adult or Child)  Duration of Current S/S  ---6 days  Location/Radiation  ---Lt tooth  Symptom Specific Meds (Dose/Time):  ---Motrin/600 mg/ 1600  Other S/S  ---Pt has Lt side upper molar pain  The tooth is very painful and it makes it hard eat  No  facial swelling observed but the gum feels swollen  Pain Scale on scale of 1-10, 10 being the worst:  ---#9 just took pain medication 15 minutes ago  Symptom progression:  ---same  Intake and Output  ---Drinking is normal / hard to chew on that side due to pain  Output is normal  Breastfeeding  ---No  Magdalena Skeleton 1990  CONFIDENTIALTY NOTICE: This fax transmission is intended only for the addressee   It contains information that is legally privileged,  confidential or otherwise protected from use or disclosure  If you are not the intended recipient, you are strictly prohibited from reviewing,  disclosing, copying using or disseminating any of this information or taking any action in reliance on or regarding this information  If you have  received this fax in error, please notify us immediately by telephone so that we can arrange for its return to us  Page: 2 of 2  Call Id: [id number]  Nurse Assessment  Last Exam/Treatment:  ---Was last seen 12/5/2019  Protocols  Protocol Title Nurse Date/Time  Elizabeth Morejon RN, ODALIS BASILIO 1/1/2020 4:15:14 PM  Question Caller Affirmed  Disp  Time Disposition Final User  1/1/2020 4:23:17 PM Call Dentist when Office is Open Jim Barreto RN, OADLIS BASILIO  1/1/2020 4:25:25 PM RN Triaged Yes Jim Barreto RN, [facility] Advice Given Per Protocol  CALL DENTIST WHEN OFFICE IS OPEN: You need to discuss this with your dentist within the next few days  Call him/her during  regular office hours  PAIN MEDICINES: * For pain relief, take acetaminophen, ibuprofen, or naproxen  * Use the lowest amount that  makes your pain feel better  ACETAMINOPHEN (E G , TYLENOL): * Take 650 mg (two 325 mg pills) by mouth every 4-6 hours as  needed  Each Regular Strength Tylenol pill has 325 mg of acetaminophen  The most you should take each day is 3,250 mg (10 Regular  Strength pills a day)  IBUPROFEN (E G , MOTRIN, ADVIL): * Take 400 mg (two 200 mg pills) by mouth every 6 hours as needed  NAPROXEN (E G , ALEVE): * Take 220 mg (one 220 mg pill) by mouth every 8 hours as needed  You may take 440 mg (two 220  mg pills) for your first dose  LOCAL COLD: Apply a cold pack or ice in a wet washcloth to the painful area of the face for 20 minutes  CALL BACK IF: * Fever or facial swelling occurs * You become worse  CARE ADVICE given per Toothache (Adult) guideline    Caller Understands: Yes  Caller Disagree/Comply: Comply  PreDisposition: Unsure

## 2020-04-01 ENCOUNTER — TELEPHONE (OUTPATIENT)
Dept: FAMILY MEDICINE CLINIC | Facility: HOSPITAL | Age: 30
End: 2020-04-01

## 2020-04-14 ENCOUNTER — TELEMEDICINE (OUTPATIENT)
Dept: FAMILY MEDICINE CLINIC | Facility: HOSPITAL | Age: 30
End: 2020-04-14

## 2020-04-14 VITALS — TEMPERATURE: 98.1 F | HEIGHT: 64 IN | BODY MASS INDEX: 30.38 KG/M2

## 2020-04-14 DIAGNOSIS — Z20.822 CLOSE EXPOSURE TO COVID-19 VIRUS: Primary | ICD-10-CM

## 2020-04-14 PROCEDURE — 99213 OFFICE O/P EST LOW 20 MIN: CPT | Performed by: NURSE PRACTITIONER

## 2020-06-12 ENCOUNTER — TELEMEDICINE (OUTPATIENT)
Dept: FAMILY MEDICINE CLINIC | Facility: HOSPITAL | Age: 30
End: 2020-06-12

## 2020-06-12 VITALS — WEIGHT: 178 LBS | TEMPERATURE: 96.9 F | BODY MASS INDEX: 30.39 KG/M2 | HEIGHT: 64 IN

## 2020-06-12 DIAGNOSIS — G44.201 ACUTE INTRACTABLE TENSION-TYPE HEADACHE: Primary | ICD-10-CM

## 2020-06-12 PROCEDURE — 3008F BODY MASS INDEX DOCD: CPT | Performed by: INTERNAL MEDICINE

## 2020-06-12 PROCEDURE — 99214 OFFICE O/P EST MOD 30 MIN: CPT | Performed by: INTERNAL MEDICINE

## 2020-06-12 RX ORDER — BUTALBITAL, ACETAMINOPHEN AND CAFFEINE 50; 325; 40 MG/1; MG/1; MG/1
1 TABLET ORAL EVERY 6 HOURS PRN
Qty: 20 TABLET | Refills: 0 | Status: SHIPPED | OUTPATIENT
Start: 2020-06-12 | End: 2021-09-18 | Stop reason: SDUPTHER

## 2020-08-03 ENCOUNTER — OFFICE VISIT (OUTPATIENT)
Dept: URGENT CARE | Facility: CLINIC | Age: 30
End: 2020-08-03
Payer: COMMERCIAL

## 2020-08-03 VITALS
TEMPERATURE: 97.3 F | BODY MASS INDEX: 30.73 KG/M2 | HEIGHT: 64 IN | HEART RATE: 64 BPM | OXYGEN SATURATION: 99 % | WEIGHT: 180 LBS | SYSTOLIC BLOOD PRESSURE: 108 MMHG | RESPIRATION RATE: 18 BRPM | DIASTOLIC BLOOD PRESSURE: 72 MMHG

## 2020-08-03 DIAGNOSIS — L23.7 ALLERGIC CONTACT DERMATITIS DUE TO PLANTS, EXCEPT FOOD: Primary | ICD-10-CM

## 2020-08-03 PROCEDURE — 99283 EMERGENCY DEPT VISIT LOW MDM: CPT | Performed by: FAMILY MEDICINE

## 2020-08-03 PROCEDURE — G0382 LEV 3 HOSP TYPE B ED VISIT: HCPCS | Performed by: FAMILY MEDICINE

## 2020-08-03 RX ORDER — METHYLPREDNISOLONE 4 MG/1
TABLET ORAL
Qty: 21 TABLET | Refills: 0 | Status: SHIPPED | OUTPATIENT
Start: 2020-08-03 | End: 2020-09-10

## 2020-08-03 RX ORDER — TRIAMCINOLONE ACETONIDE 1 MG/G
CREAM TOPICAL 2 TIMES DAILY
Qty: 30 G | Refills: 0 | Status: SHIPPED | OUTPATIENT
Start: 2020-08-03 | End: 2020-09-10

## 2020-08-03 NOTE — PROGRESS NOTES
NAME: Genet Kellogg is a 26 y o  female  : 1990    MRN: [id number]      Assessment and Plan   Allergic contact dermatitis due to plants, except food [L23 7]  1  Allergic contact dermatitis due to plants, except food  triamcinolone (KENALOG) 0 1 % cream    methylPREDNISolone 4 MG tablet therapy pack           Patient Instructions   Patient Instructions   F/u here as needed  Steroid cream as needed  Begin steroid pills    Proceed to ER if symptoms worsen  Chief Complaint     Chief Complaint   Patient presents with    Rash     on arms, hand and legs for 3 days         History of Present Illness   Here c/o rash  Fingers, wrists, arms, legs  Itchy  Present since Friday  Using cortisone cream      Review of Systems   Review of Systems   Constitutional: Negative for fatigue and fever  HENT: Negative for ear pain and trouble swallowing  Eyes: Negative for visual disturbance  Respiratory: Negative for chest tightness and shortness of breath  Cardiovascular: Negative for chest pain  Gastrointestinal: Negative for abdominal pain, diarrhea, nausea and vomiting  Genitourinary: Negative for difficulty urinating and dysuria  Skin: Positive for rash  Negative for wound  Neurological: Negative for weakness and headaches           Current Medications       Current Outpatient Medications:     butalbital-acetaminophen-caffeine (FIORICET,ESGIC) -40 mg per tablet, Take 1 tablet by mouth every 6 (six) hours as needed for headaches, Disp: 20 tablet, Rfl: 0    Levothyroxine Sodium 75 MCG CAPS, Take 75 mcg by mouth, Disp: , Rfl:     LORazepam (ATIVAN) 1 mg tablet, Take 1/2-1 tablet twice daily as needed for anxiety, Disp: 30 tablet, Rfl: 0    methylPREDNISolone 4 MG tablet therapy pack, Use as directed on package, Disp: 21 tablet, Rfl: 0    triamcinolone (KENALOG) 0 1 % cream, Apply topically 2 (two) times a day, Disp: 30 g, Rfl: 0    Current Allergies     Allergies as of 2020 - Reviewed 08/03/2020   Allergen Reaction Noted    Clarithromycin Rash 04/21/2012              Past Medical History:   Diagnosis Date    Anxiety     Depression     Disease of thyroid gland     Insomnia     resolved 04/06/2015    Mild cervical dysplasia     resolved 04/06/2015    Substance abuse ([address] )     Tension headache     resolved 10/08/2016       Past Surgical History:   Procedure Laterality Date    CHOLECYSTECTOMY LAPAROSCOPIC N/A 7/20/2016    Procedure: CHOLECYSTECTOMY LAPAROSCOPIC;  Surgeon: Bijan Leonardo MD;  Location:  MAIN OR;  Service:    [employer] THYROID ANTIBODIES PANEL (HISTORICAL)      positive resolved 10/08/2016       Family History   Problem Relation Age of Onset    Depression Mother          Medications have been verified  The following portions of the patient's history were reviewed and updated as appropriate: allergies, current medications, past family history, past medical history, past social history, past surgical history and problem list     Objective   /72   Pulse 64   Temp (!) 97 3 °F (36 3 °C)   Resp 18   Ht 5' 4"   Wt 81 6 kg (180 lb)   SpO2 99%   BMI 30 90 kg/m²      Physical Exam     Physical Exam   Constitutional: She is oriented to person, place, and time  She appears well-developed  HENT:   Head: Normocephalic  Neck: Normal range of motion  Cardiovascular: Normal rate, regular rhythm and normal heart sounds  Exam reveals no gallop and no friction rub  No murmur heard  Pulmonary/Chest: Effort normal and breath sounds normal  No respiratory distress  She has no wheezes  She has no rales  Abdominal: Soft  Bowel sounds are normal  She exhibits no distension  There is no abdominal tenderness  There is no rebound and no guarding  Musculoskeletal: Normal range of motion  Neurological: She is oriented to person, place, and time  Skin: Skin is warm and dry  No rash noted     Macular papular rash on B arms, legs   Psychiatric: Thought content normal    Nursing note and vitals reviewed

## 2020-09-07 ENCOUNTER — HOSPITAL ENCOUNTER (EMERGENCY)
Facility: HOSPITAL | Age: 30
Discharge: HOME | End: 2020-09-07
Attending: EMERGENCY MEDICINE | Admitting: EMERGENCY MEDICINE
Payer: COMMERCIAL

## 2020-09-07 ENCOUNTER — APPOINTMENT (EMERGENCY)
Dept: RADIOLOGY | Facility: HOSPITAL | Age: 30
End: 2020-09-07
Payer: COMMERCIAL

## 2020-09-07 VITALS
BODY MASS INDEX: 29.99 KG/M2 | DIASTOLIC BLOOD PRESSURE: 97 MMHG | SYSTOLIC BLOOD PRESSURE: 152 MMHG | WEIGHT: 180 LBS | RESPIRATION RATE: 20 BRPM | HEIGHT: 65 IN | TEMPERATURE: 98.1 F | HEART RATE: 69 BPM | OXYGEN SATURATION: 99 %

## 2020-09-07 DIAGNOSIS — S93.401A RIGHT ANKLE SPRAIN: Primary | ICD-10-CM

## 2020-09-07 PROCEDURE — 73610 X-RAY EXAM OF ANKLE: CPT

## 2020-09-07 PROCEDURE — 99284 EMERGENCY DEPT VISIT MOD MDM: CPT | Performed by: EMERGENCY MEDICINE

## 2020-09-07 PROCEDURE — 99283 EMERGENCY DEPT VISIT LOW MDM: CPT

## 2020-09-07 PROCEDURE — 96372 THER/PROPH/DIAG INJ SC/IM: CPT

## 2020-09-07 RX ORDER — METHOCARBAMOL 500 MG/1
500 TABLET, FILM COATED ORAL 3 TIMES DAILY
Qty: 12 TABLET | Refills: 0 | Status: SHIPPED | OUTPATIENT
Start: 2020-09-07 | End: 2020-11-12

## 2020-09-07 RX ORDER — KETOROLAC TROMETHAMINE 30 MG/ML
15 INJECTION, SOLUTION INTRAMUSCULAR; INTRAVENOUS ONCE
Status: COMPLETED | OUTPATIENT
Start: 2020-09-07 | End: 2020-09-07

## 2020-09-07 RX ORDER — NAPROXEN 500 MG/1
500 TABLET ORAL 2 TIMES DAILY WITH MEALS
Qty: 30 TABLET | Refills: 0 | Status: SHIPPED | OUTPATIENT
Start: 2020-09-07 | End: 2020-11-12

## 2020-09-07 RX ADMIN — KETOROLAC TROMETHAMINE 15 MG: 30 INJECTION, SOLUTION INTRAMUSCULAR at 19:14

## 2020-09-08 NOTE — ED PROVIDER NOTES
History  Chief Complaint   Patient presents with    Ankle Pain     Pt with right ankle pain since friday, denies injury  Right ankle and heel pain that started three days ago  Able to bear weight  No injury but may have twisted it she is unsure  No other modifying factors or associated symptoms no systemic symptoms  No previous injuries to the area  Exam is unremarkable  Plan is x-ray rule out fracture or lytic lesion  Could be simple strain or sprain      Ankle Pain   Location:  Ankle  Time since incident:  3 days  Injury: no (unsure)    Ankle location:  R ankle  Pain details:     Quality:  Aching    Radiates to:  Does not radiate    Severity:  Mild    Onset quality:  Gradual    Duration:  3 days    Timing:  Intermittent    Progression:  Waxing and waning  Chronicity:  New  Dislocation: no    Tetanus status:  Out of date  Relieved by:  Nothing  Worsened by:  Nothing  Ineffective treatments:  Acetaminophen  Associated symptoms: no back pain, no fatigue, no fever and no neck pain        Prior to Admission Medications   Prescriptions Last Dose Informant Patient Reported? Taking?    LORazepam (ATIVAN) 1 mg tablet Past Month at Unknown time  No Yes   Sig: Take 1/2-1 tablet twice daily as needed for anxiety   Levothyroxine Sodium 75 MCG CAPS 9/7/2020 at Unknown time  Yes Yes   Sig: Take 75 mcg by mouth   butalbital-acetaminophen-caffeine (FIORICET,ESGIC) -40 mg per tablet Past Month at Unknown time  No Yes   Sig: Take 1 tablet by mouth every 6 (six) hours as needed for headaches   methylPREDNISolone 4 MG tablet therapy pack Not Taking at Unknown time  No No   Sig: Use as directed on package   Patient not taking: Reported on 9/7/2020   triamcinolone (KENALOG) 0 1 % cream Not Taking at Unknown time  No No   Sig: Apply topically 2 (two) times a day   Patient not taking: Reported on 9/7/2020      Facility-Administered Medications: None       Past Medical History:   Diagnosis Date    Anxiety     Depression  Disease of thyroid gland     Insomnia     resolved 04/06/2015    Mild cervical dysplasia     resolved 04/06/2015    Substance [facility] [address] [address]     Tension headache     resolved 10/08/2016       Past Surgical History:   Procedure Laterality Date    CHOLECYSTECTOMY LAPAROSCOPIC N/A 7/20/2016    Procedure: Shiloh Maier;  Surgeon: Saw Lovelace MD;  [facility]: [facility] OR;  Service:    Tresa Anderson THYROID ANTIBODIES PANEL (HISTORICAL)      positive resolved 10/08/2016       Family History   Problem Relation Age of Onset    Depression Mother      I have reviewed and agree with the history as documented  E-Cigarette/Vaping    E-Cigarette Use Never User      E-Cigarette/Vaping Substances    Nicotine No     THC No     CBD No     Flavoring No     Other No     Unknown No      Social History     Tobacco Use    Smoking status: Current Every Day Smoker     Packs/day: 0 50    Smokeless tobacco: Never Used    Tobacco comment: Has an appointment to see PMD   Substance Use Topics    Alcohol use: No     Comment: (history)    Drug use: No     Comment: crack cocaine use clean x 9 years per allscripts       Review of Systems   Constitutional: Negative for chills, fatigue and fever  Eyes: Negative for photophobia and visual disturbance  Respiratory: Negative for cough and shortness of breath  Cardiovascular: Negative for chest pain, palpitations and leg swelling  Gastrointestinal: Negative for diarrhea, nausea and vomiting  Endocrine: Negative for polydipsia and polyuria  Genitourinary: Negative for decreased urine volume, difficulty urinating, dysuria and frequency  Musculoskeletal: Negative for back pain, neck pain and neck stiffness  Skin: Negative for color change and rash  Allergic/Immunologic: Negative for environmental allergies and immunocompromised state  Neurological: Negative for dizziness and headaches  Hematological: Negative for adenopathy  Does not bruise/bleed easily  Psychiatric/Behavioral: Negative for dysphoric mood  The patient is not nervous/anxious  Physical Exam  Physical Exam  Vitals signs and nursing note reviewed  Constitutional:       General: She is not in acute distress  Appearance: She is well-developed  She is not diaphoretic  HENT:      Head: Normocephalic and atraumatic  Nose: Nose normal    Eyes:      General: No scleral icterus  Conjunctiva/sclera: Conjunctivae normal       Pupils: Pupils are equal, round, and reactive to light  Neck:      Musculoskeletal: Normal range of motion and neck supple  Thyroid: No thyromegaly  Vascular: No JVD  Trachea: No tracheal deviation  Cardiovascular:      Rate and Rhythm: Normal rate and regular rhythm  Heart sounds: Normal heart sounds  No friction rub  No gallop  Pulmonary:      Effort: Pulmonary effort is normal  No respiratory distress  Breath sounds: Normal breath sounds  No wheezing or rales  Chest:      Chest wall: No tenderness  Abdominal:      General: Bowel sounds are normal  There is no distension  Palpations: Abdomen is soft  There is no mass  Tenderness: There is no abdominal tenderness  There is no guarding or rebound  Hernia: No hernia is present  Musculoskeletal: Normal range of motion  General: No tenderness or deformity  Skin:     General: Skin is warm and dry  Findings: No erythema  Neurological:      Mental Status: She is alert and oriented to person, place, and time  Cranial Nerves: No cranial nerve deficit  Coordination: Coordination normal       Deep Tendon Reflexes: Reflexes are normal and symmetric     Psychiatric:         Behavior: Behavior normal          Vital Signs  ED Triage Vitals [09/07/20 1819]   Temperature Pulse Respirations Blood Pressure SpO2   98 1 °F (36 7 °C) 69 20 152/97 99 %      Temp Source Heart Rate Source Patient Position - Orthostatic VS BP Location FiO2 (%)   Temporal Monitor Sitting Right arm --      Pain Score       5           Vitals:    09/07/20 1819   BP: 152/97   Pulse: 69   Patient Position - Orthostatic VS: Sitting         Visual Acuity      ED Medications  Medications   ketorolac (TORADOL) injection 15 mg (15 mg Intramuscular Given 9/7/20 1914)       Diagnostic Studies  Results Reviewed     None                 XR ankle 3+ views RIGHT   ED Interpretation by Jake Foote DO (09/07 1914)   No acute fracture                 Procedures  Procedures         ED Course       US AUDIT      Most Recent Value   Initial Alcohol Screen: US AUDIT-C    1  How often do you have a drink containing alcohol? 1 Filed at: 09/07/2020 1819   2  How many drinks containing alcohol do you have on a typical day you are drinking? 1 Filed at: 09/07/2020 1819   3a  Male UNDER 65: How often do you have five or more drinks on one occasion? 0 Filed at: 09/07/2020 1819   3b  FEMALE Any Age, or MALE 65+: How often do you have 4 or more drinks on one occassion? 0 Filed at: 09/07/2020 1819   Audit-C Score  2 Filed at: 09/07/2020 1819                  JUAN ANTONIO/DAST-10      Most Recent Value   How many times in the past year have you    Used an illegal drug or used a prescription medication for non-medical reasons?   Never Filed at: 09/07/2020 1820                                MDM  Number of Diagnoses or Management Options  Right ankle sprain: new and requires workup     Amount and/or Complexity of Data Reviewed  Tests in the radiology section of CPT®: ordered and reviewed  Independent visualization of images, tracings, or specimens: yes    Patient Progress  Patient progress: stable        Disposition  Final diagnoses:   Right ankle sprain     Time reflects when diagnosis was documented in both MDM as applicable and the Disposition within this note     Time User Action Codes Description Comment    9/7/2020  7:14 PM Gilbert Cheng Add [P71 [id number]] Right ankle sprain       ED Disposition     ED Disposition Condition Date/Time Comment    Discharge Stable Mon Sep 7, 2020  7:14 PM Linda Gant discharge to home/self care  Follow-up Information     Follow up With Specialties Details Why Contact Info    Emelia Pickard, [address], Nurse Practitioner Schedule an appointment as soon as possible for a visit  As needed Edwin  [address] [facility]  [address] [facility] [address] [phone]            Discharge Medication List as of 9/7/2020  7:16 PM      START taking these medications    Details   methocarbamol (ROBAXIN) 500 mg tablet Take 1 tablet (500 mg total) by mouth 3 (three) times a day, Starting Mon 9/7/2020, Normal      naproxen (NAPROSYN) 500 mg tablet Take 1 tablet (500 mg total) by mouth 2 (two) times a day with meals, Starting Mon 9/7/2020, Normal         CONTINUE these medications which have NOT CHANGED    Details   butalbital-acetaminophen-caffeine (FIORICET,ESGIC) -40 mg per tablet Take 1 tablet by mouth every 6 (six) hours as needed for headaches, Starting Fri 6/12/2020, Normal      Levothyroxine Sodium 75 MCG CAPS Take 75 mcg by mouth, Starting Fri 7/21/2017, Historical Med      LORazepam (ATIVAN) 1 mg tablet Take 1/2-1 tablet twice daily as needed for anxiety, Normal      methylPREDNISolone 4 MG tablet therapy pack Use as directed on package, Normal      triamcinolone (KENALOG) 0 1 % cream Apply topically 2 (two) times a day, Starting Mon 8/3/2020, Normal           No discharge procedures on file      PDMP Review     None          ED Provider  Electronically Signed by           Bob Whitaker DO  09/07/20 0544

## 2020-09-10 ENCOUNTER — OFFICE VISIT (OUTPATIENT)
Dept: FAMILY MEDICINE CLINIC | Facility: HOSPITAL | Age: 30
End: 2020-09-10
Payer: COMMERCIAL

## 2020-09-10 VITALS
BODY MASS INDEX: 30.89 KG/M2 | SYSTOLIC BLOOD PRESSURE: 118 MMHG | DIASTOLIC BLOOD PRESSURE: 70 MMHG | HEART RATE: 72 BPM | WEIGHT: 185.4 LBS | TEMPERATURE: 98.4 F | HEIGHT: 65 IN

## 2020-09-10 DIAGNOSIS — M25.571 ACUTE RIGHT ANKLE PAIN: Primary | ICD-10-CM

## 2020-09-10 PROCEDURE — 99213 OFFICE O/P EST LOW 20 MIN: CPT | Performed by: FAMILY MEDICINE

## 2020-09-10 RX ORDER — NALOXONE HYDROCHLORIDE 4 MG/.1ML
SPRAY NASAL
Qty: 1 EACH | Refills: 1 | Status: SHIPPED | OUTPATIENT
Start: 2020-09-10 | End: 2020-11-12

## 2020-09-10 RX ORDER — TRAMADOL HYDROCHLORIDE 50 MG/1
50 TABLET ORAL EVERY 6 HOURS PRN
Qty: 20 TABLET | Refills: 0 | Status: SHIPPED | OUTPATIENT
Start: 2020-09-10 | End: 2020-11-12

## 2020-09-10 NOTE — PROGRESS NOTES
Assessment/Plan:      Problem List Items Addressed This Visit     None      Visit Diagnoses     Acute left ankle pain    -  Primary    Relevant Medications    traMADol (ULTRAM) 50 mg tablet    naloxone (NARCAN) 4 mg/0 1 mL nasal spray    Other Relevant Orders    Ambulatory referral to Physical Therapy         Acute ankle pain  Suspect more of Peroneal tendonitis rather than acute ankle sprain  Will however continue with nsaids/tylenol as needed, continue with ICE,   Will add tramadol for breakthrough severe pain  As being on ativan, rx for narcan also given  Advised physical therapy and order placed  Subjective:   Chief Complaint   Patient presents with    Follow-up     [facility] 09/07/20 - right ankle sprain         Patient ID: Shaneka Crawley is a 26 y o  female  Pt with 3 days of ankle pain  Was seen in the ER  Was told she had ankle sprain  Denies any injury to the ankle  No twisting injury  No inversion/no eversion injury  There is some swelling  Walks a lot at work  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  Xrays in the Er were negatvie  No hx of gout        The following portions of the patient's history were reviewed and updated as appropriate: allergies, current medications, past family history, past medical history, past social history, past surgical history and problem list     Review of Systems   Constitutional: Positive for activity change  Negative for fatigue and unexpected weight change           Objective:  Vitals:    09/10/20 1520   BP: 118/70   Pulse: 72   Temp: 98 4 °F (36 9 °C)   Weight: 84 1 kg (185 lb 6 4 oz)   Height: 5' 4 75" (1 645 m)     BP Readings from Last 6 Encounters:   09/10/20 118/70   09/07/20 152/97   08/03/20 108/72   12/05/19 128/72   09/27/19 110/78   08/16/19 132/78      Wt Readings from Last 6 Encounters:   09/10/20 84 1 kg (185 lb 6 4 oz)   09/07/20 81 6 kg (180 lb)   08/03/20 81 6 kg (180 lb)   06/12/20 80 7 kg (178 lb)   12/05/19 80 3 kg (177 lb)   09/27/19 80 4 kg (177 lb 3 2 oz)             Physical Exam  Vitals signs reviewed  Constitutional:       General: She is not in acute distress  Appearance: Normal appearance  She is not ill-appearing or diaphoretic  Musculoskeletal:      Right foot: Normal range of motion  No tenderness  Feet:       Comments: Right foot: no swelling, no erythema, no bony tenderness, no ttp over the 5th mtp  No tenderness over the forefoot    Right ankle: no lump/mass over the achilles  Mild swelling over the lateral ankle area  No pain over the ATF  Pain noted along the peroneal tendon  Pain noted more with dorsiflexion  Less pain with inversion of the ankle  Neurological:      Mental Status: She is alert  No visits with results within 6 Month(s) from this visit  Latest known visit with results is:   Office Visit on 09/27/2019   Component Date Value Ref Range Status     RAPID STREP A 09/27/2019 Negative  Negative Final    Beta Strep Grp A Culture 09/27/2019 Comment*  Final    Comment: Beta-hemolytic colonies, not group A Streptococcus isolated  Penicillin and ampicillin are drugs of choice for treatment of  beta-hemolytic streptococcal infections  Susceptibility testing of  penicillins and other beta-lactam agents approved by the FDA for  treatment of beta-hemolytic streptococcal infections need not be  performed routinely because nonsusceptible isolates are extremely  rare in any beta-hemolytic streptococcus and have not been reported  for Streptococcus pyogenes (group A)   (CLSI)

## 2020-11-12 ENCOUNTER — OFFICE VISIT (OUTPATIENT)
Dept: FAMILY MEDICINE CLINIC | Facility: HOSPITAL | Age: 30
End: 2020-11-12
Payer: COMMERCIAL

## 2020-11-12 VITALS
HEIGHT: 65 IN | DIASTOLIC BLOOD PRESSURE: 60 MMHG | WEIGHT: 190.6 LBS | BODY MASS INDEX: 31.75 KG/M2 | SYSTOLIC BLOOD PRESSURE: 110 MMHG | HEART RATE: 79 BPM | TEMPERATURE: 97.5 F

## 2020-11-12 DIAGNOSIS — F17.200 NICOTINE DEPENDENCE, UNCOMPLICATED, UNSPECIFIED NICOTINE PRODUCT TYPE: ICD-10-CM

## 2020-11-12 DIAGNOSIS — E04.9 ENLARGED THYROID GLAND: ICD-10-CM

## 2020-11-12 DIAGNOSIS — F41.8 DEPRESSION WITH ANXIETY: ICD-10-CM

## 2020-11-12 DIAGNOSIS — E66.09 CLASS 1 OBESITY DUE TO EXCESS CALORIES WITH SERIOUS COMORBIDITY AND BODY MASS INDEX (BMI) OF 32.0 TO 32.9 IN ADULT: ICD-10-CM

## 2020-11-12 DIAGNOSIS — Z00.00 ANNUAL PHYSICAL EXAM: Primary | ICD-10-CM

## 2020-11-12 DIAGNOSIS — Z13.1 SCREENING FOR DIABETES MELLITUS (DM): ICD-10-CM

## 2020-11-12 DIAGNOSIS — Z13.6 SCREENING FOR CARDIOVASCULAR CONDITION: ICD-10-CM

## 2020-11-12 DIAGNOSIS — E03.4 HYPOTHYROIDISM DUE TO ACQUIRED ATROPHY OF THYROID: ICD-10-CM

## 2020-11-12 PROCEDURE — 99395 PREV VISIT EST AGE 18-39: CPT | Performed by: FAMILY MEDICINE

## 2020-11-12 RX ORDER — NICOTINE 21 MG/24HR
1 PATCH, TRANSDERMAL 24 HOURS TRANSDERMAL EVERY 24 HOURS
Qty: 28 PATCH | Refills: 0 | Status: SHIPPED | OUTPATIENT
Start: 2020-11-12

## 2020-11-24 ENCOUNTER — ANNUAL EXAM (OUTPATIENT)
Dept: FAMILY MEDICINE CLINIC | Facility: HOSPITAL | Age: 30
End: 2020-11-24
Payer: COMMERCIAL

## 2020-11-24 VITALS
SYSTOLIC BLOOD PRESSURE: 128 MMHG | BODY MASS INDEX: 31.36 KG/M2 | OXYGEN SATURATION: 98 % | HEIGHT: 65 IN | WEIGHT: 188.2 LBS | HEART RATE: 86 BPM | TEMPERATURE: 97.8 F | DIASTOLIC BLOOD PRESSURE: 78 MMHG

## 2020-11-24 DIAGNOSIS — Z01.419 ENCNTR FOR GYN EXAM (GENERAL) (ROUTINE) W/O ABN FINDINGS: Primary | ICD-10-CM

## 2020-11-24 DIAGNOSIS — Z23 ENCOUNTER FOR IMMUNIZATION: ICD-10-CM

## 2020-11-24 DIAGNOSIS — Z12.4 SCREENING FOR CERVICAL CANCER: ICD-10-CM

## 2020-11-24 DIAGNOSIS — F41.9 ANXIETY: ICD-10-CM

## 2020-11-24 PROCEDURE — 90686 IIV4 VACC NO PRSV 0.5 ML IM: CPT | Performed by: NURSE PRACTITIONER

## 2020-11-24 PROCEDURE — 3008F BODY MASS INDEX DOCD: CPT | Performed by: NURSE PRACTITIONER

## 2020-11-24 PROCEDURE — 90471 IMMUNIZATION ADMIN: CPT | Performed by: NURSE PRACTITIONER

## 2020-11-24 PROCEDURE — 4004F PT TOBACCO SCREEN RCVD TLK: CPT | Performed by: NURSE PRACTITIONER

## 2020-11-24 PROCEDURE — 99395 PREV VISIT EST AGE 18-39: CPT | Performed by: NURSE PRACTITIONER

## 2020-11-25 RX ORDER — LORAZEPAM 1 MG/1
TABLET ORAL
Qty: 30 TABLET | Refills: 0 | Status: SHIPPED | OUTPATIENT
Start: 2020-11-25 | End: 2021-09-18 | Stop reason: SDUPTHER

## 2020-11-30 LAB
CYTOLOGIST CVX/VAG CYTO: NORMAL
DX ICD CODE: NORMAL
OTHER STN SPEC: NORMAL
OTHER STN SPEC: NORMAL
PATH REPORT.FINAL DX SPEC: NORMAL
SL AMB NOTE:: NORMAL
SL AMB SPECIMEN ADEQUACY: NORMAL
SL AMB TEST METHODOLOGY: NORMAL

## 2021-04-16 NOTE — PROGRESS NOTES
Answers for HPI/ROS submitted by the patient on 2/20/2019   Cough  Chronicity: new  Onset: more than 1 month ago  Progression since onset: gradually worsening  Frequency: constantly  Cough characteristics: productive of brown sputum  chest pain: No  chills: No  ear congestion: No  ear pain: No  fever: No  headaches: Yes  heartburn: No  hemoptysis: No  [facility]  [address] [place] [address]  [address], [id number]  Tel: [phone]      Assessment/Plan:    Problem List Items Addressed This Visit     None      Visit Diagnoses     Acute non-recurrent sinusitis, unspecified location    -  Primary    Relevant Medications    amoxicillin-clavulanate (AUGMENTIN) 875-125 mg per tablet    promethazine-codeine (PHENERGAN WITH CODEINE) 6 25-10 mg/5 mL syrup    Anxiety        Relevant Medications    LORazepam (ATIVAN) 1 mg tablet        With acute sinusitis , worsening, in the setting of underlying psoriasis possibly psoriatic arthropathy and currently on biologics  Treat with augmentin  Cough medication to help with sytmpoms  Increase fluids  To call if not imrpoving          _____________________________________________________________________    History of Present Illness:     Patient is here for an acute visit      Cold Like Symptoms (for over a month) and Cough    Reviewed patient questionare  1 month of sytmpoms of nasal congestion, sob, coughing  With sinus pressure and pain  With chest congestion  Intermittent wheezing and sob  No feve,r no chills  No improvement with otc medications  Cough   This is a new problem  The current episode started more than 1 month ago  The problem has been gradually worsening  The problem occurs constantly  The cough is productive of brown sputum  Associated symptoms include headaches, nasal congestion, shortness of breath and wheezing   Pertinent negatives include no chest pain, chills, ear congestion, ear pain, fever, heartburn, hemoptysis, myalgias, postnasal drip, rash, rhinorrhea, sore throat, sweats or weight loss  The symptoms are aggravated by cold air and lying down  Risk factors for lung disease include smoking/tobacco exposure  She has tried OTC cough suppressant for the symptoms  The treatment provided no relief  There is no history of asthma, bronchiectasis, bronchitis, COPD or emphysema  -----------------------------  Review of Systems   Constitutional: Negative for chills, fever and weight loss  HENT: Negative for ear pain, postnasal drip, rhinorrhea and sore throat  Respiratory: Positive for cough, shortness of breath and wheezing  Negative for hemoptysis  Cardiovascular: Negative for chest pain  Gastrointestinal: Negative for heartburn  Musculoskeletal: Negative for myalgias  Skin: Negative for rash  Neurological: Positive for headaches         Social Hx reviewed:    Social History     Socioeconomic History    Marital status: Single     Spouse name: Not on file    Number of children: 3    Years of education: Not on file    Highest education level: Not on file   Occupational History    Not on file   Social Needs    Financial resource strain: Not on file    Food insecurity:     Worry: Not on file     Inability: Not on file    Transportation needs:     Medical: Not on file     Non-medical: Not on file   Tobacco Use    Smoking status: Current Every Day Smoker    Smokeless tobacco: Never Used    Tobacco comment: Has an appointment to see PMD   Substance and Sexual Activity    Alcohol use: No     Comment: (history)    Drug use: No     Comment: crack cocaine use clean x 9 years per [employer]    Sexual activity: Yes   Lifestyle    Physical activity:     Days per week: Not on file     Minutes per session: Not on file    Stress: Not on file   Relationships    Social connections:     Talks on phone: Not on file     Gets together: Not on file     Attends Holiness service: Not on file     Active member of club or organization: Not on file     Attends meetings of clubs or organizations: Not on file     Relationship status: Not on file    Intimate partner violence:     Fear of current or ex partner: Not on file     Emotionally abused: Not on file     Physically abused: Not on file     Forced sexual activity: Not on file   Other Topics Concern    Not on file   Social History Narrative    Always uses seat belt    Caffeine use    No Congregational beliefs    Under stress       Past Medical History:   Diagnosis Date    Anxiety     Disease of thyroid gland     Insomnia     resolved 04/06/2015    Mild cervical dysplasia     resolved 04/06/2015    Substance abuse ([facility] [address] )     Tension headache     resolved 10/08/2016           Allergies   Allergen Reactions    Clarithromycin Rash     Reaction Date: [id number];  Biaxin         Vitals:    02/23/19 0834   BP: 128/80   Pulse: 83   Temp: 97 8 °F (36 6 °C)     Physical Exam   Constitutional: She appears well-developed and well-nourished  HENT:   Head: Normocephalic and atraumatic  Right Ear: External ear normal    Left Ear: External ear normal    Nose: Nose normal    Mouth/Throat: Oropharynx is clear and moist  No oropharyngeal exudate  Eyes: Pupils are equal, round, and reactive to light  Conjunctivae and EOM are normal    Neck: Normal range of motion  Neck supple  Cardiovascular: Normal rate, regular rhythm and normal heart sounds  Pulmonary/Chest: Effort normal and breath sounds normal    Abdominal: Soft  Bowel sounds are normal    Skin: Skin is warm and dry  Psychiatric: She has a normal mood and affect  Nursing note and vitals reviewed  To call our office if any concerns/questions at [phone]  myalgias: No  nasal congestion: Yes  postnasal drip: No  rash: No  rhinorrhea: No  shortness of breath: Yes  sore throat: No  sweats: No  weight loss:  No  wheezing: Yes  Aggravated by: cold air, lying down  Risk factors for lung disease: smoking/tobacco exposure [[id number]]

## 2021-07-27 ENCOUNTER — TELEPHONE (OUTPATIENT)
Dept: FAMILY MEDICINE CLINIC | Facility: HOSPITAL | Age: 31
End: 2021-07-27

## 2021-07-27 PROCEDURE — U0003 INFECTIOUS AGENT DETECTION BY NUCLEIC ACID (DNA OR RNA); SEVERE ACUTE RESPIRATORY SYNDROME CORONAVIRUS 2 (SARS-COV-2) (CORONAVIRUS DISEASE [COVID-19]), AMPLIFIED PROBE TECHNIQUE, MAKING USE OF HIGH THROUGHPUT TECHNOLOGIES AS DESCRIBED BY CMS-2020-01-R: HCPCS | Performed by: INTERNAL MEDICINE

## 2021-07-27 PROCEDURE — U0005 INFEC AGEN DETEC AMPLI PROBE: HCPCS | Performed by: INTERNAL MEDICINE

## 2021-07-27 NOTE — TELEPHONE ENCOUNTER
Was eating lunch at work with a coworker last Thursday  Yesterday she was told that the coworker was covid positive  She does not have her covid vaccines  She needs to get tested before she can go back to work    PCB

## 2021-07-27 NOTE — TELEPHONE ENCOUNTER
Order for COVID testing placed - be aware should test on day 5 AFTER the exposure and should quarantine pending test results

## 2021-09-18 DIAGNOSIS — F41.9 ANXIETY: ICD-10-CM

## 2021-09-18 DIAGNOSIS — G44.201 ACUTE INTRACTABLE TENSION-TYPE HEADACHE: ICD-10-CM

## 2021-09-20 RX ORDER — LORAZEPAM 1 MG/1
TABLET ORAL
Qty: 30 TABLET | Refills: 0 | Status: SHIPPED | OUTPATIENT
Start: 2021-09-20

## 2021-09-20 RX ORDER — BUTALBITAL, ACETAMINOPHEN AND CAFFEINE 50; 325; 40 MG/1; MG/1; MG/1
1 TABLET ORAL EVERY 6 HOURS PRN
Qty: 20 TABLET | Refills: 0 | Status: SHIPPED | OUTPATIENT
Start: 2021-09-20 | End: 2022-06-08 | Stop reason: SDUPTHER

## 2021-09-22 ENCOUNTER — OFFICE VISIT (OUTPATIENT)
Dept: FAMILY MEDICINE CLINIC | Facility: HOSPITAL | Age: 31
End: 2021-09-22
Payer: COMMERCIAL

## 2021-09-22 ENCOUNTER — HOSPITAL ENCOUNTER (OUTPATIENT)
Dept: RADIOLOGY | Facility: HOSPITAL | Age: 31
Discharge: HOME | End: 2021-09-22
Payer: COMMERCIAL

## 2021-09-22 VITALS
HEART RATE: 98 BPM | HEIGHT: 65 IN | SYSTOLIC BLOOD PRESSURE: 136 MMHG | BODY MASS INDEX: 27.89 KG/M2 | DIASTOLIC BLOOD PRESSURE: 70 MMHG | WEIGHT: 167.4 LBS | TEMPERATURE: 96.9 F

## 2021-09-22 DIAGNOSIS — G89.29 CHRONIC LOW BACK PAIN WITHOUT SCIATICA, UNSPECIFIED BACK PAIN LATERALITY: ICD-10-CM

## 2021-09-22 DIAGNOSIS — Z11.59 NEED FOR HEPATITIS C SCREENING TEST: ICD-10-CM

## 2021-09-22 DIAGNOSIS — M54.50 CHRONIC LOW BACK PAIN WITHOUT SCIATICA, UNSPECIFIED BACK PAIN LATERALITY: ICD-10-CM

## 2021-09-22 DIAGNOSIS — Z11.4 SCREENING FOR HIV (HUMAN IMMUNODEFICIENCY VIRUS): ICD-10-CM

## 2021-09-22 DIAGNOSIS — Z23 ENCOUNTER FOR IMMUNIZATION: ICD-10-CM

## 2021-09-22 DIAGNOSIS — Z13.6 SCREENING FOR CARDIOVASCULAR CONDITION: ICD-10-CM

## 2021-09-22 DIAGNOSIS — E03.4 HYPOTHYROIDISM DUE TO ACQUIRED ATROPHY OF THYROID: ICD-10-CM

## 2021-09-22 DIAGNOSIS — E06.3 HASHIMOTO'S THYROIDITIS: Primary | ICD-10-CM

## 2021-09-22 DIAGNOSIS — Z13.1 SCREENING FOR DIABETES MELLITUS (DM): ICD-10-CM

## 2021-09-22 DIAGNOSIS — E66.3 OVERWEIGHT: ICD-10-CM

## 2021-09-22 DIAGNOSIS — F17.200 NICOTINE DEPENDENCE, UNCOMPLICATED, UNSPECIFIED NICOTINE PRODUCT TYPE: ICD-10-CM

## 2021-09-22 DIAGNOSIS — F41.8 DEPRESSION WITH ANXIETY: ICD-10-CM

## 2021-09-22 PROCEDURE — 4004F PT TOBACCO SCREEN RCVD TLK: CPT | Performed by: FAMILY MEDICINE

## 2021-09-22 PROCEDURE — 90686 IIV4 VACC NO PRSV 0.5 ML IM: CPT | Performed by: FAMILY MEDICINE

## 2021-09-22 PROCEDURE — 72110 X-RAY EXAM L-2 SPINE 4/>VWS: CPT

## 2021-09-22 PROCEDURE — 90471 IMMUNIZATION ADMIN: CPT | Performed by: FAMILY MEDICINE

## 2021-09-22 PROCEDURE — 99214 OFFICE O/P EST MOD 30 MIN: CPT | Performed by: FAMILY MEDICINE

## 2021-09-22 NOTE — PROGRESS NOTES
Assessment/Plan:      Problem List Items Addressed This Visit        Endocrine    Hashimoto's thyroiditis - Primary    Hypothyroidism due to acquired atrophy of thyroid    Relevant Orders    CBC    Comprehensive metabolic panel    TSH, 3rd generation with Free T4 reflex       Other    Depression with anxiety    Nicotine dependence      Other Visit Diagnoses     Encounter for immunization        Relevant Orders    influenza vaccine, quadrivalent, 0 5 mL, preservative-free, for adult and pediatric patients 6 mos+ (AFLURIA, FLUARIX, FLULAVAL, FLUZONE) (Completed)    Screening for HIV (human immunodeficiency virus)        Relevant Orders    Human Immunodeficiency Virus 1/2 Antigen / Antibody ( Fourth Generation) with Reflex Testing    Screening for diabetes mellitus (DM)        Relevant Orders    Hemoglobin A1C    Screening for cardiovascular condition        Relevant Orders    Lipid Panel with Direct LDL reflex    Need for hepatitis C screening test        Relevant Orders    HCV Antibody reflex to [facility]    Chronic low back pain without sciatica, unspecified back pain laterality        Relevant Orders    XR spine lumbar minimum 4 views non injury    Ambulatory referral to [facility]    Overweight               Plan/Discussion:    Needing update on blood work  This is ordered again  Advised to get this done  Continue the same thyroid dosing for now  Chronic low back pain  Has underlying scoliosis  This is ongoing for several months  Has not seen physical therapy in quite a while for this  Will refer the [facility] / physical therapy  Plain x-rays to be done  May need referral to pain management  Nicotine dependence  Advise  Counseled  BMI Counseling: Body mass index is 28 29 kg/m²  The BMI is above normal  Nutrition recommendations include decreasing portion sizes, encouraging healthy choices of fruits and vegetables and moderation in carbohydrate intake  Rationale for BMI follow-up plan is due to patient being overweight or obese  Subjective:   Chief Complaint   Patient presents with    Follow-up        Patient ID: Flex Carmona is a 33 y o  female  Patient is here for follow-up of chronic conditions  Patient with hypothyroidism  Has not been taking her medications  Has not had lab work  Overall feels okay  Reports she is doing relatively well  She is afraid of needles and has not had that blood work yet  Patient is in today with her significant other who reports they will make sure that she does get these things completed  No new other concerns today except ongoing low back pain  She has a history of underlying scoliosis  Over the last few months with recurrent intermittent but more frequent low back pain  No sciatica  No incontinence  No fever no chills  Able to walk  Uses Tylenol as needed for pain  Has not seen physical therapy  Has not had any back operations  The following portions of the patient's history were reviewed and updated as appropriate: allergies, current medications, past family history, past medical history, past social history, past surgical history and problem list     Review of Systems   Constitutional: Negative  Negative for activity change, appetite change, chills, diaphoresis, fatigue and fever  HENT: Negative for congestion, facial swelling and sore throat  Respiratory: Negative  Negative for apnea, cough, chest tightness and shortness of breath  Cardiovascular: Negative  Negative for chest pain and palpitations  Gastrointestinal: Negative  Negative for abdominal distention, abdominal pain, blood in stool, constipation, diarrhea and nausea  Genitourinary: Negative  Negative for difficulty urinating, dysuria, flank pain and frequency           Objective:  Vitals:    09/22/21 0940   BP: 136/70   Pulse: 98   Temp: (!) 96 9 °F (36 1 °C)   Weight: 75 9 kg (167 lb 6 4 oz) Height: 5' 4 5" (1 638 m)     BP Readings from Last 6 Encounters:   09/22/21 136/70   11/24/20 128/78   11/12/20 110/60   09/10/20 118/70   09/07/20 152/97   08/03/20 108/72      Wt Readings from Last 6 Encounters:   09/22/21 75 9 kg (167 lb 6 4 oz)   11/24/20 85 4 kg (188 lb 3 2 oz)   11/12/20 86 5 kg (190 lb 9 6 oz)   09/10/20 84 1 kg (185 lb 6 4 oz)   09/07/20 81 6 kg (180 lb)   08/03/20 81 6 kg (180 lb)             Physical Exam  Vitals and nursing note reviewed  Constitutional:       Appearance: Normal appearance  She is well-developed  HENT:      Head: Normocephalic and atraumatic  Right Ear: External ear normal       Left Ear: External ear normal       Nose: Nose normal    Eyes:      Conjunctiva/sclera: Conjunctivae normal       Pupils: Pupils are equal, round, and reactive to light  Neck:      Thyroid: No thyromegaly  Trachea: No tracheal deviation  Cardiovascular:      Rate and Rhythm: Normal rate and regular rhythm  Heart sounds: Normal heart sounds  No murmur heard  Pulmonary:      Effort: Pulmonary effort is normal  No respiratory distress  Breath sounds: Normal breath sounds  No wheezing  Abdominal:      General: Bowel sounds are normal       Palpations: Abdomen is soft  Musculoskeletal:         General: Normal range of motion  Cervical back: Normal range of motion and neck supple  Lumbar back: No swelling, edema, deformity, signs of trauma, spasms or bony tenderness  Normal range of motion  Negative right straight leg raise test and negative left straight leg raise test  Scoliosis present  Skin:     General: Skin is warm and dry  Neurological:      Mental Status: She is alert and oriented to person, place, and time

## 2021-09-24 LAB
ALBUMIN SERPL-MCNC: 4.7 G/DL (ref 3.8–4.8)
ALBUMIN/GLOB SERPL: 1.8 {RATIO} (ref 1.2–2.2)
ALP SERPL-CCNC: 84 IU/L (ref 44–121)
ALT SERPL-CCNC: 7 IU/L (ref 0–32)
AST SERPL-CCNC: 15 IU/L (ref 0–40)
BILIRUB SERPL-MCNC: 0.4 MG/DL (ref 0–1.2)
BUN SERPL-MCNC: 11 MG/DL (ref 6–20)
BUN/CREAT SERPL: 13 (ref 9–23)
CALCIUM SERPL-MCNC: 9.6 MG/DL (ref 8.7–10.2)
CHLORIDE SERPL-SCNC: 104 MMOL/L (ref 96–106)
CHOLEST SERPL-MCNC: 204 MG/DL (ref 100–199)
CO2 SERPL-SCNC: 25 MMOL/L (ref 20–29)
CREAT SERPL-MCNC: 0.86 MG/DL (ref 0.57–1)
ERYTHROCYTE [DISTWIDTH] IN BLOOD BY AUTOMATED COUNT: 12.3 % (ref 11.7–15.4)
EST. AVERAGE GLUCOSE BLD GHB EST-MCNC: 97 MG/DL
GLOBULIN SER-MCNC: 2.6 G/DL (ref 1.5–4.5)
GLUCOSE SERPL-MCNC: 87 MG/DL (ref 65–99)
HBA1C MFR BLD: 5 % (ref 4.8–5.6)
HCT VFR BLD AUTO: 42.7 % (ref 34–46.6)
HCV AB S/CO SERPL IA: <0.1 S/CO RATIO (ref 0–0.9)
HDLC SERPL-MCNC: 38 MG/DL
HGB BLD-MCNC: 14.5 G/DL (ref 11.1–15.9)
HIV 1+2 AB+HIV1 P24 AG SERPL QL IA: NON REACTIVE
LDLC SERPL CALC-MCNC: 149 MG/DL (ref 0–99)
LDLC/HDLC SERPL: 3.9 RATIO (ref 0–3.2)
MCH RBC QN AUTO: 31.5 PG (ref 26.6–33)
MCHC RBC AUTO-ENTMCNC: 34 G/DL (ref 31.5–35.7)
MCV RBC AUTO: 93 FL (ref 79–97)
PLATELET # BLD AUTO: 209 X10E3/UL (ref 150–450)
POTASSIUM SERPL-SCNC: 4.1 MMOL/L (ref 3.5–5.2)
PROT SERPL-MCNC: 7.3 G/DL (ref 6–8.5)
RBC # BLD AUTO: 4.6 X10E6/UL (ref 3.77–5.28)
SL AMB EGFR AFRICAN AMERICAN: 103 ML/MIN/1.73
SL AMB EGFR NON AFRICAN AMERICAN: 89 ML/MIN/1.73
SL AMB INTERPRETATION: NORMAL
SL AMB T4, FREE (DIRECT): 0.92 NG/DL (ref 0.82–1.77)
SL AMB VLDL CHOLESTEROL CALC: 17 MG/DL (ref 5–40)
SODIUM SERPL-SCNC: 141 MMOL/L (ref 134–144)
TRIGL SERPL-MCNC: 91 MG/DL (ref 0–149)
TSH SERPL DL<=0.005 MIU/L-ACNC: 4.75 UIU/ML (ref 0.45–4.5)
WBC # BLD AUTO: 5.8 X10E3/UL (ref 3.4–10.8)

## 2021-09-27 DIAGNOSIS — E03.4 HYPOTHYROIDISM DUE TO ACQUIRED ATROPHY OF THYROID: Primary | ICD-10-CM

## 2021-09-27 RX ORDER — LEVOTHYROXINE SODIUM 75 UG/1
75 CAPSULE ORAL DAILY
Qty: 60 CAPSULE | Refills: 1 | Status: SHIPPED | OUTPATIENT
Start: 2021-09-27 | End: 2021-10-04 | Stop reason: SDUPTHER

## 2021-09-30 ENCOUNTER — OFFICE VISIT (OUTPATIENT)
Dept: FAMILY MEDICINE CLINIC | Facility: HOSPITAL | Age: 31
End: 2021-09-30
Payer: COMMERCIAL

## 2021-09-30 VITALS
DIASTOLIC BLOOD PRESSURE: 70 MMHG | BODY MASS INDEX: 28.22 KG/M2 | WEIGHT: 169.4 LBS | TEMPERATURE: 99.1 F | HEIGHT: 65 IN | HEART RATE: 76 BPM | SYSTOLIC BLOOD PRESSURE: 122 MMHG

## 2021-09-30 DIAGNOSIS — M54.50 ACUTE LEFT-SIDED LOW BACK PAIN WITHOUT SCIATICA: ICD-10-CM

## 2021-09-30 DIAGNOSIS — F17.200 NICOTINE DEPENDENCE, UNCOMPLICATED, UNSPECIFIED NICOTINE PRODUCT TYPE: ICD-10-CM

## 2021-09-30 DIAGNOSIS — H66.90 ACUTE OTITIS MEDIA, UNSPECIFIED OTITIS MEDIA TYPE: ICD-10-CM

## 2021-09-30 DIAGNOSIS — J06.9 UPPER RESPIRATORY TRACT INFECTION, UNSPECIFIED TYPE: Primary | ICD-10-CM

## 2021-09-30 PROCEDURE — 3008F BODY MASS INDEX DOCD: CPT | Performed by: FAMILY MEDICINE

## 2021-09-30 PROCEDURE — 99213 OFFICE O/P EST LOW 20 MIN: CPT | Performed by: FAMILY MEDICINE

## 2021-09-30 PROCEDURE — U0003 INFECTIOUS AGENT DETECTION BY NUCLEIC ACID (DNA OR RNA); SEVERE ACUTE RESPIRATORY SYNDROME CORONAVIRUS 2 (SARS-COV-2) (CORONAVIRUS DISEASE [COVID-19]), AMPLIFIED PROBE TECHNIQUE, MAKING USE OF HIGH THROUGHPUT TECHNOLOGIES AS DESCRIBED BY CMS-2020-01-R: HCPCS | Performed by: FAMILY MEDICINE

## 2021-09-30 PROCEDURE — U0005 INFEC AGEN DETEC AMPLI PROBE: HCPCS | Performed by: FAMILY MEDICINE

## 2021-09-30 RX ORDER — AMOXICILLIN 500 MG/1
500 CAPSULE ORAL EVERY 8 HOURS SCHEDULED
Qty: 30 CAPSULE | Refills: 0 | Status: SHIPPED | OUTPATIENT
Start: 2021-09-30 | End: 2021-10-07

## 2021-10-01 LAB — SARS-COV-2 RNA RESP QL NAA+PROBE: NEGATIVE

## 2021-10-04 ENCOUNTER — TELEPHONE (OUTPATIENT)
Dept: FAMILY MEDICINE CLINIC | Facility: HOSPITAL | Age: 31
End: 2021-10-04

## 2021-10-04 DIAGNOSIS — E03.4 HYPOTHYROIDISM DUE TO ACQUIRED ATROPHY OF THYROID: ICD-10-CM

## 2021-10-04 DIAGNOSIS — B00.1 HERPES LABIALIS: ICD-10-CM

## 2021-10-04 DIAGNOSIS — M54.50 ACUTE LEFT-SIDED LOW BACK PAIN WITHOUT SCIATICA: Primary | ICD-10-CM

## 2021-10-04 RX ORDER — LEVOTHYROXINE SODIUM 75 UG/1
75 CAPSULE ORAL DAILY
Qty: 30 CAPSULE | Refills: 1 | Status: SHIPPED | OUTPATIENT
Start: 2021-10-04 | End: 2022-06-08 | Stop reason: SDUPTHER

## 2021-10-06 RX ORDER — VALACYCLOVIR HYDROCHLORIDE 1 G/1
2000 TABLET, FILM COATED ORAL 2 TIMES DAILY
Qty: 4 TABLET | Refills: 0 | Status: SHIPPED | OUTPATIENT
Start: 2021-10-06 | End: 2021-10-07

## 2021-10-12 ENCOUNTER — EVALUATION (OUTPATIENT)
Dept: PHYSICAL THERAPY | Facility: CLINIC | Age: 31
End: 2021-10-12
Payer: COMMERCIAL

## 2021-10-12 DIAGNOSIS — M54.50 ACUTE LEFT-SIDED LOW BACK PAIN WITHOUT SCIATICA: ICD-10-CM

## 2021-10-12 PROCEDURE — 97162 PT EVAL MOD COMPLEX 30 MIN: CPT | Performed by: PHYSICAL THERAPIST

## 2021-10-12 PROCEDURE — 97110 THERAPEUTIC EXERCISES: CPT | Performed by: PHYSICAL THERAPIST

## 2021-10-20 ENCOUNTER — OFFICE VISIT (OUTPATIENT)
Dept: PHYSICAL THERAPY | Facility: CLINIC | Age: 31
End: 2021-10-20
Payer: COMMERCIAL

## 2021-10-20 DIAGNOSIS — M54.50 ACUTE LEFT-SIDED LOW BACK PAIN WITHOUT SCIATICA: Primary | ICD-10-CM

## 2021-10-20 PROCEDURE — 97110 THERAPEUTIC EXERCISES: CPT | Performed by: PHYSICAL THERAPIST

## 2021-10-20 PROCEDURE — 97112 NEUROMUSCULAR REEDUCATION: CPT | Performed by: PHYSICAL THERAPIST

## 2021-10-26 ENCOUNTER — OFFICE VISIT (OUTPATIENT)
Dept: PHYSICAL THERAPY | Facility: CLINIC | Age: 31
End: 2021-10-26
Payer: COMMERCIAL

## 2021-10-26 DIAGNOSIS — M54.50 ACUTE LEFT-SIDED LOW BACK PAIN WITHOUT SCIATICA: Primary | ICD-10-CM

## 2021-10-26 PROCEDURE — 97112 NEUROMUSCULAR REEDUCATION: CPT | Performed by: PHYSICAL THERAPIST

## 2021-10-26 PROCEDURE — 97110 THERAPEUTIC EXERCISES: CPT | Performed by: PHYSICAL THERAPIST

## 2021-10-29 ENCOUNTER — OFFICE VISIT (OUTPATIENT)
Dept: PHYSICAL THERAPY | Facility: CLINIC | Age: 31
End: 2021-10-29
Payer: COMMERCIAL

## 2021-10-29 DIAGNOSIS — M54.50 ACUTE LEFT-SIDED LOW BACK PAIN WITHOUT SCIATICA: Primary | ICD-10-CM

## 2021-10-29 PROCEDURE — 97110 THERAPEUTIC EXERCISES: CPT | Performed by: PHYSICAL THERAPIST

## 2021-10-29 PROCEDURE — 97140 MANUAL THERAPY 1/> REGIONS: CPT | Performed by: PHYSICAL THERAPIST

## 2021-10-29 PROCEDURE — 97112 NEUROMUSCULAR REEDUCATION: CPT | Performed by: PHYSICAL THERAPIST

## 2021-11-03 ENCOUNTER — OFFICE VISIT (OUTPATIENT)
Dept: PHYSICAL THERAPY | Facility: CLINIC | Age: 31
End: 2021-11-03
Payer: COMMERCIAL

## 2021-11-03 DIAGNOSIS — M54.50 ACUTE LEFT-SIDED LOW BACK PAIN WITHOUT SCIATICA: Primary | ICD-10-CM

## 2021-11-03 PROCEDURE — 97110 THERAPEUTIC EXERCISES: CPT | Performed by: PHYSICAL THERAPIST

## 2021-11-03 PROCEDURE — 97112 NEUROMUSCULAR REEDUCATION: CPT | Performed by: PHYSICAL THERAPIST

## 2021-11-03 PROCEDURE — 97140 MANUAL THERAPY 1/> REGIONS: CPT | Performed by: PHYSICAL THERAPIST

## 2021-11-04 ENCOUNTER — OFFICE VISIT (OUTPATIENT)
Dept: PHYSICAL THERAPY | Facility: CLINIC | Age: 31
End: 2021-11-04
Payer: COMMERCIAL

## 2021-11-04 DIAGNOSIS — M54.50 ACUTE LEFT-SIDED LOW BACK PAIN WITHOUT SCIATICA: Primary | ICD-10-CM

## 2021-11-04 PROCEDURE — 97140 MANUAL THERAPY 1/> REGIONS: CPT | Performed by: PHYSICAL THERAPIST

## 2021-11-04 PROCEDURE — 97110 THERAPEUTIC EXERCISES: CPT | Performed by: PHYSICAL THERAPIST

## 2021-11-04 PROCEDURE — 97112 NEUROMUSCULAR REEDUCATION: CPT | Performed by: PHYSICAL THERAPIST

## 2021-11-05 ENCOUNTER — APPOINTMENT (OUTPATIENT)
Dept: PHYSICAL THERAPY | Facility: CLINIC | Age: 31
End: 2021-11-05
Payer: COMMERCIAL

## 2022-06-08 DIAGNOSIS — E03.4 HYPOTHYROIDISM DUE TO ACQUIRED ATROPHY OF THYROID: ICD-10-CM

## 2022-06-08 DIAGNOSIS — G44.201 ACUTE INTRACTABLE TENSION-TYPE HEADACHE: ICD-10-CM

## 2022-06-09 RX ORDER — LEVOTHYROXINE SODIUM 75 UG/1
75 CAPSULE ORAL DAILY
Qty: 30 CAPSULE | Refills: 0 | Status: SHIPPED | OUTPATIENT
Start: 2022-06-09

## 2022-06-09 RX ORDER — BUTALBITAL, ACETAMINOPHEN AND CAFFEINE 50; 325; 40 MG/1; MG/1; MG/1
1 TABLET ORAL EVERY 6 HOURS PRN
Qty: 20 TABLET | Refills: 0 | Status: SHIPPED | OUTPATIENT
Start: 2022-06-09